# Patient Record
Sex: MALE | Race: BLACK OR AFRICAN AMERICAN | NOT HISPANIC OR LATINO | Employment: FULL TIME | URBAN - METROPOLITAN AREA
[De-identification: names, ages, dates, MRNs, and addresses within clinical notes are randomized per-mention and may not be internally consistent; named-entity substitution may affect disease eponyms.]

---

## 2023-06-15 PROBLEM — H61.23 BILATERAL IMPACTED CERUMEN: Status: ACTIVE | Noted: 2023-06-15

## 2023-06-15 PROBLEM — H57.89 REDNESS OF EYE, RIGHT: Status: ACTIVE | Noted: 2023-06-15

## 2023-06-15 PROBLEM — S05.51XA: Status: ACTIVE | Noted: 2023-06-15

## 2023-06-15 PROBLEM — Z96.1 PSEUDOPHAKIA OF BOTH EYES: Status: ACTIVE | Noted: 2023-06-15

## 2023-06-15 PROBLEM — R01.1 SYSTOLIC MURMUR: Status: ACTIVE | Noted: 2023-06-15

## 2023-06-15 PROBLEM — H18.529 ABMD (ANTERIOR BASEMENT MEMBRANE DYSTROPHY): Status: ACTIVE | Noted: 2023-06-15

## 2023-06-15 PROBLEM — H26.9 CATARACT, BILATERAL: Status: ACTIVE | Noted: 2023-06-15

## 2023-06-15 PROBLEM — I10 HTN (HYPERTENSION): Status: ACTIVE | Noted: 2023-06-15

## 2023-06-15 PROBLEM — H90.11 CONDUCTIVE HEARING LOSS OF RIGHT EAR WITH UNRESTRICTED HEARING OF LEFT EAR: Status: ACTIVE | Noted: 2023-06-15

## 2023-06-15 PROBLEM — G47.33 OBSTRUCTIVE SLEEP APNEA, ADULT: Status: ACTIVE | Noted: 2023-06-15

## 2023-06-15 PROBLEM — E55.9 VITAMIN D INSUFFICIENCY: Status: ACTIVE | Noted: 2023-06-15

## 2023-06-15 PROBLEM — H53.8 BLURRING OF VISUAL IMAGE: Status: ACTIVE | Noted: 2023-06-15

## 2023-06-15 PROBLEM — R73.01 IFG (IMPAIRED FASTING GLUCOSE): Status: ACTIVE | Noted: 2023-06-15

## 2023-06-15 RX ORDER — HYDROCHLOROTHIAZIDE 25 MG/1
1 TABLET ORAL DAILY
COMMUNITY
Start: 2018-07-09 | End: 2023-09-29

## 2023-06-15 RX ORDER — LATANOPROST 50 UG/ML
1 SOLUTION/ DROPS OPHTHALMIC NIGHTLY
COMMUNITY
Start: 2017-11-21

## 2023-06-15 RX ORDER — VIT C/E/ZN/COPPR/LUTEIN/ZEAXAN 250MG-90MG
1 CAPSULE ORAL DAILY
COMMUNITY
Start: 2022-08-24 | End: 2024-06-03 | Stop reason: ALTCHOICE

## 2023-06-15 RX ORDER — CETIRIZINE HYDROCHLORIDE 10 MG/1
10 TABLET ORAL DAILY
COMMUNITY

## 2023-06-19 ENCOUNTER — APPOINTMENT (OUTPATIENT)
Dept: LAB | Facility: LAB | Age: 63
End: 2023-06-19
Payer: COMMERCIAL

## 2023-06-19 ENCOUNTER — OFFICE VISIT (OUTPATIENT)
Dept: PRIMARY CARE | Facility: CLINIC | Age: 63
End: 2023-06-19
Payer: COMMERCIAL

## 2023-06-19 VITALS
TEMPERATURE: 96.3 F | BODY MASS INDEX: 33.49 KG/M2 | OXYGEN SATURATION: 99 % | HEIGHT: 63 IN | DIASTOLIC BLOOD PRESSURE: 81 MMHG | SYSTOLIC BLOOD PRESSURE: 150 MMHG | WEIGHT: 189 LBS

## 2023-06-19 DIAGNOSIS — Z12.5 PROSTATE CANCER SCREENING: ICD-10-CM

## 2023-06-19 DIAGNOSIS — M54.50 ACUTE MIDLINE LOW BACK PAIN WITHOUT SCIATICA: ICD-10-CM

## 2023-06-19 DIAGNOSIS — I10 PRIMARY HYPERTENSION: ICD-10-CM

## 2023-06-19 DIAGNOSIS — R35.0 INCREASED URINARY FREQUENCY: ICD-10-CM

## 2023-06-19 DIAGNOSIS — R73.01 IFG (IMPAIRED FASTING GLUCOSE): Primary | ICD-10-CM

## 2023-06-19 DIAGNOSIS — E55.9 VITAMIN D INSUFFICIENCY: ICD-10-CM

## 2023-06-19 DIAGNOSIS — Z12.11 COLON CANCER SCREENING: ICD-10-CM

## 2023-06-19 DIAGNOSIS — R52 PAIN: ICD-10-CM

## 2023-06-19 PROCEDURE — 3079F DIAST BP 80-89 MM HG: CPT | Performed by: INTERNAL MEDICINE

## 2023-06-19 PROCEDURE — 99214 OFFICE O/P EST MOD 30 MIN: CPT | Performed by: INTERNAL MEDICINE

## 2023-06-19 PROCEDURE — 3077F SYST BP >= 140 MM HG: CPT | Performed by: INTERNAL MEDICINE

## 2023-06-19 SDOH — ECONOMIC STABILITY: FOOD INSECURITY: WITHIN THE PAST 12 MONTHS, THE FOOD YOU BOUGHT JUST DIDN'T LAST AND YOU DIDN'T HAVE MONEY TO GET MORE.: NEVER TRUE

## 2023-06-19 SDOH — ECONOMIC STABILITY: FOOD INSECURITY: WITHIN THE PAST 12 MONTHS, YOU WORRIED THAT YOUR FOOD WOULD RUN OUT BEFORE YOU GOT MONEY TO BUY MORE.: NEVER TRUE

## 2023-06-19 ASSESSMENT — PATIENT HEALTH QUESTIONNAIRE - PHQ9
SUM OF ALL RESPONSES TO PHQ9 QUESTIONS 1 & 2: 0
1. LITTLE INTEREST OR PLEASURE IN DOING THINGS: NOT AT ALL
2. FEELING DOWN, DEPRESSED OR HOPELESS: NOT AT ALL

## 2023-06-19 ASSESSMENT — LIFESTYLE VARIABLES
HOW OFTEN DO YOU HAVE A DRINK CONTAINING ALCOHOL: NEVER
HOW OFTEN DO YOU HAVE SIX OR MORE DRINKS ON ONE OCCASION: NEVER
HOW MANY STANDARD DRINKS CONTAINING ALCOHOL DO YOU HAVE ON A TYPICAL DAY: PATIENT DOES NOT DRINK
AUDIT-C TOTAL SCORE: 0
SKIP TO QUESTIONS 9-10: 1

## 2023-06-19 ASSESSMENT — PAIN SCALES - GENERAL: PAINLEVEL: 10-WORST PAIN EVER

## 2023-06-19 NOTE — PROGRESS NOTES
"Chief Complaint/HPI:    HTN: patient takes HCTZ only at the present time      IFG: patient had blood testing in 5/2021, brother and father have had issues with glucose also     s/p cataract surgery: patient had cataract surgery at [facility] in [place], patient states that he still has visual issues, patient has seen Dr Valencia at [facility] for evaluation . He wanted to defer any additional eye procedures for now, a \"scraping \" was suggested to improve the vision, but the patient has deferred for now. He now sees Dr Almodovar at [facility]     Patient did not have labs completed, he is due for lab testing     vitamin d insufficiency: patient takes vitamin d    Low back pain: patient has noted low back pain for the past few days, the pain is lower back region, it does not radiate to the lower extremities.     Urinary frequency: patient is having urinary frequency and he get urgency also  ROS otherwise negative aside from what was mentioned above in HPI.      Patient Active Problem List   Diagnosis    ABMD (anterior basement membrane dystrophy)    Bilateral impacted cerumen    Blurring of visual image    Cataract, bilateral    Conductive hearing loss of right ear with unrestricted hearing of left ear    Foreign body in anterior segment of right eyeball    HTN (hypertension)    IFG (impaired fasting glucose)    Obstructive sleep apnea, adult    Pseudophakia of both eyes    Redness of eye, right    Vitamin D insufficiency    Systolic murmur    Acute midline low back pain without sciatica    Prostate cancer screening    Increased urinary frequency         Past Medical History:   Diagnosis Date    Other allergy status, other than to drugs and biological substances     History of environmental allergies    Personal history of other diseases of the circulatory system 11/05/2019    History of hypertension    Personal history of other diseases of the nervous system and sense organs     History of glaucoma     Past Surgical History:   Procedure " "Laterality Date    OTHER SURGICAL HISTORY  05/18/2021    Cataract surgery     Social History     Social History Narrative    Not on file         ALLERGIES  Patient has no known allergies.      MEDICATIONS  Current Outpatient Medications on File Prior to Visit   Medication Sig Dispense Refill    aspirin-calcium carbonate 81 mg-300 mg calcium(777 mg) tablet Take by mouth.      cetirizine (ZyrTEC) 10 mg tablet Take 1 tablet (10 mg) by mouth once daily.      cholecalciferol (Vitamin D-3) 25 MCG (1000 UT) capsule Take 1 capsule (25 mcg) by mouth once daily.      hydroCHLOROthiazide (HYDRODiuril) 25 mg tablet Take 1 tablet (25 mg) by mouth once daily.      latanoprost (Xalatan) 0.005 % ophthalmic solution Administer 1 drop into affected eye(s) once daily at bedtime.       No current facility-administered medications on file prior to visit.         PHYSICAL EXAM  /81 (BP Location: Right arm, Patient Position: Sitting, BP Cuff Size: Adult)   Temp 35.7 °C (96.3 °F) (Temporal)   Ht 1.6 m (5' 3\")   Wt 85.7 kg (189 lb)   SpO2 99%   BMI 33.48 kg/m²   Body mass index is 33.48 kg/m².  Constitutional   General appearance: Abnormal.  well developed, appears healthy, well nourished, overweight.   Eyes   Inspection of eyes: Sclera and conjunctiva were normal. wears glasses.   Ears, Nose, Mouth, and Throat   Ears: Auricles: Normal.   Pulmonary   Respiratory assessment: No respiratory distress, normal respiratory rhythm and effort.    Auscultation of Lungs: Clear bilateral breath sounds.   Cardiovascular   Auscultation of heart: Apical pulse normal, heart rate and rhythm normal, normal S1 and S2, no murmurs and no pericardial rub. The heart rate was normal. The rhythm was regular. Heart sounds: normal S1 and normal S2. A grade 1 systolic murmur was heard at the LUSB > RUSB.    Exam for edema: No peripheral edema. no carotid bruits noted.   Lymphatic   Palpation of lymph nodes in neck: No cervical lymphadenopathy.   Neurologic "   Cranial nerves: Nerves 2-12 were intact, no focal neuro defects.   Psychiatric   Orientation: Oriented to person, place, and time.    Mood and affect: Normal.   MSK  No localized  tenderness at present, discomfort with activity noted in the region of the med lumbar spine    ASSESSMENT/PLAN  Problem List Items Addressed This Visit       HTN (hypertension)    Current Assessment & Plan     Slight systolic HTN, check labs         Relevant Orders    Comprehensive metabolic panel    CBC and Auto Differential    Lipid panel    Albumin, urine, random    Hemoglobin A1c    IFG (impaired fasting glucose) - Primary    Current Assessment & Plan     Check labs         Relevant Orders    Comprehensive metabolic panel    CBC and Auto Differential    Lipid panel    Albumin, urine, random    Hemoglobin A1c    Vitamin D insufficiency    Current Assessment & Plan     Check labs, continue vitamin d         Relevant Orders    Comprehensive metabolic panel    CBC and Auto Differential    Vitamin D 25-Hydroxy,Total    Lipid panel    Acute midline low back pain without sciatica    Current Assessment & Plan     Check an SPEP, check an LS spine x ray          Relevant Orders    Comprehensive metabolic panel    CBC and Auto Differential    Serum Protein Electrophoresis    XR lumbar spine complete 4+ views    Referral to Urology    Prostate cancer screening    Relevant Orders    Prostate Spec.Ag,Screen    Referral to Urology    Increased urinary frequency    Relevant Orders    Referral to Urology     Other Visit Diagnoses       Pain        Relevant Orders    Comprehensive metabolic panel    CBC and Auto Differential    Serum Protein Electrophoresis    XR lumbar spine complete 4+ views    PT eval and treat    Colon cancer screening        Relevant Orders    Colonoscopy        Refer to urology to assess increased urinary frequency, urinary urgency,  he also has some low back pain     Check labs, check LS spine x ray, refer to PT for back  assessment    Follow up in 6 months otherwise      Thiago Camara MD

## 2023-07-19 LAB
ALANINE AMINOTRANSFERASE (SGPT) (U/L) IN SER/PLAS: 31 U/L (ref 10–52)
ALBUMIN (G/DL) IN SER/PLAS: 4.1 G/DL (ref 3.4–5)
ALBUMIN (MG/L) IN URINE: 13.9 MG/L
ALBUMIN/CREATININE (UG/MG) IN URINE: 15.5 UG/MG CRT (ref 0–30)
ALKALINE PHOSPHATASE (U/L) IN SER/PLAS: 75 U/L (ref 33–136)
ANION GAP IN SER/PLAS: 9 MMOL/L (ref 10–20)
ASPARTATE AMINOTRANSFERASE (SGOT) (U/L) IN SER/PLAS: 21 U/L (ref 9–39)
BASOPHILS (10*3/UL) IN BLOOD BY AUTOMATED COUNT: 0.05 X10E9/L (ref 0–0.1)
BASOPHILS/100 LEUKOCYTES IN BLOOD BY AUTOMATED COUNT: 1 % (ref 0–2)
BILIRUBIN TOTAL (MG/DL) IN SER/PLAS: 0.3 MG/DL (ref 0–1.2)
CALCIDIOL (25 OH VITAMIN D3) (NG/ML) IN SER/PLAS: 10 NG/ML
CALCIUM (MG/DL) IN SER/PLAS: 8.9 MG/DL (ref 8.6–10.3)
CARBON DIOXIDE, TOTAL (MMOL/L) IN SER/PLAS: 29 MMOL/L (ref 21–32)
CHLORIDE (MMOL/L) IN SER/PLAS: 105 MMOL/L (ref 98–107)
CHOLESTEROL (MG/DL) IN SER/PLAS: 159 MG/DL (ref 0–199)
CHOLESTEROL IN HDL (MG/DL) IN SER/PLAS: 31.1 MG/DL
CHOLESTEROL/HDL RATIO: 5.1
CREATININE (MG/DL) IN SER/PLAS: 0.96 MG/DL (ref 0.5–1.3)
CREATININE (MG/DL) IN URINE: 89.9 MG/DL (ref 20–370)
EOSINOPHILS (10*3/UL) IN BLOOD BY AUTOMATED COUNT: 0.11 X10E9/L (ref 0–0.7)
EOSINOPHILS/100 LEUKOCYTES IN BLOOD BY AUTOMATED COUNT: 2.1 % (ref 0–6)
ERYTHROCYTE DISTRIBUTION WIDTH (RATIO) BY AUTOMATED COUNT: 14.7 % (ref 11.5–14.5)
ERYTHROCYTE MEAN CORPUSCULAR HEMOGLOBIN CONCENTRATION (G/DL) BY AUTOMATED: 33.2 G/DL (ref 32–36)
ERYTHROCYTE MEAN CORPUSCULAR VOLUME (FL) BY AUTOMATED COUNT: 79 FL (ref 80–100)
ERYTHROCYTES (10*6/UL) IN BLOOD BY AUTOMATED COUNT: 5.68 X10E12/L (ref 4.5–5.9)
ESTIMATED AVERAGE GLUCOSE FOR HBA1C: 140 MG/DL
GFR MALE: 90 ML/MIN/1.73M2
GLUCOSE (MG/DL) IN SER/PLAS: 121 MG/DL (ref 74–99)
HEMATOCRIT (%) IN BLOOD BY AUTOMATED COUNT: 44.9 % (ref 41–52)
HEMOGLOBIN (G/DL) IN BLOOD: 14.9 G/DL (ref 13.5–17.5)
HEMOGLOBIN A1C/HEMOGLOBIN TOTAL IN BLOOD: 6.5 %
IMMATURE GRANULOCYTES/100 LEUKOCYTES IN BLOOD BY AUTOMATED COUNT: 0.2 % (ref 0–0.9)
LDL: 110 MG/DL (ref 0–99)
LEUKOCYTES (10*3/UL) IN BLOOD BY AUTOMATED COUNT: 5.2 X10E9/L (ref 4.4–11.3)
LYMPHOCYTES (10*3/UL) IN BLOOD BY AUTOMATED COUNT: 2.05 X10E9/L (ref 1.2–4.8)
LYMPHOCYTES/100 LEUKOCYTES IN BLOOD BY AUTOMATED COUNT: 39.3 % (ref 13–44)
MONOCYTES (10*3/UL) IN BLOOD BY AUTOMATED COUNT: 0.56 X10E9/L (ref 0.1–1)
MONOCYTES/100 LEUKOCYTES IN BLOOD BY AUTOMATED COUNT: 10.7 % (ref 2–10)
NEUTROPHILS (10*3/UL) IN BLOOD BY AUTOMATED COUNT: 2.43 X10E9/L (ref 1.2–7.7)
NEUTROPHILS/100 LEUKOCYTES IN BLOOD BY AUTOMATED COUNT: 46.7 % (ref 40–80)
PLATELETS (10*3/UL) IN BLOOD AUTOMATED COUNT: 205 X10E9/L (ref 150–450)
POTASSIUM (MMOL/L) IN SER/PLAS: 2.9 MMOL/L (ref 3.5–5.3)
PROSTATE SPECIFIC AG (NG/ML) IN SER/PLAS: 1.01 NG/ML (ref 0–4)
PROTEIN TOTAL: 7.6 G/DL (ref 6.4–8.2)
PROTEIN TOTAL: 7.6 G/DL (ref 6.4–8.2)
SODIUM (MMOL/L) IN SER/PLAS: 140 MMOL/L (ref 136–145)
TRIGLYCERIDE (MG/DL) IN SER/PLAS: 90 MG/DL (ref 0–149)
UREA NITROGEN (MG/DL) IN SER/PLAS: 16 MG/DL (ref 6–23)
VLDL: 18 MG/DL (ref 0–40)

## 2023-07-20 ENCOUNTER — TELEPHONE (OUTPATIENT)
Dept: PRIMARY CARE | Facility: CLINIC | Age: 63
End: 2023-07-20
Payer: COMMERCIAL

## 2023-07-20 DIAGNOSIS — T50.2X5A DIURETIC-INDUCED HYPOKALEMIA: Primary | ICD-10-CM

## 2023-07-20 DIAGNOSIS — E87.6 DIURETIC-INDUCED HYPOKALEMIA: Primary | ICD-10-CM

## 2023-07-20 RX ORDER — POTASSIUM CHLORIDE 20 MEQ/1
20 TABLET, EXTENDED RELEASE ORAL DAILY
Qty: 30 TABLET | Refills: 1 | Status: SHIPPED | OUTPATIENT
Start: 2023-07-20 | End: 2023-09-07 | Stop reason: SDUPTHER

## 2023-07-20 NOTE — TELEPHONE ENCOUNTER
Patient had labs done per urology and potassium was 2.9. He was advised to present to ER by urologist. Please advise/

## 2023-07-23 LAB
ALBUMIN ELP: 4.2 G/DL (ref 3.4–5)
ALPHA 1: 0.3 G/DL (ref 0.2–0.6)
ALPHA 2: 0.5 G/DL (ref 0.4–1.1)
BETA: 0.9 G/DL (ref 0.5–1.2)
GAMMA GLOBULIN: 1.7 G/DL (ref 0.5–1.4)
M-PROTEIN 1: 0.3 G/DL
PATH REVIEW - SERUM IMMUNOFIXATION: NORMAL
PATH REVIEW-SERUM PROTEIN ELECTROPHORESIS: NORMAL
PROTEIN ELECTROPHORESIS INTERPRETATION: ABNORMAL
PROTEIN TOTAL: 7.6 G/DL (ref 6.4–8.2)
SERUM IMMUNOFIXATION INTERPRETATION: ABNORMAL

## 2023-08-01 ENCOUNTER — HOSPITAL ENCOUNTER (OUTPATIENT)
Dept: DATA CONVERSION | Facility: HOSPITAL | Age: 63
End: 2023-08-01
Attending: INTERNAL MEDICINE | Admitting: INTERNAL MEDICINE
Payer: COMMERCIAL

## 2023-08-01 DIAGNOSIS — D12.8 BENIGN NEOPLASM OF RECTUM: ICD-10-CM

## 2023-08-01 DIAGNOSIS — Z12.11 ENCOUNTER FOR SCREENING FOR MALIGNANT NEOPLASM OF COLON: ICD-10-CM

## 2023-08-01 DIAGNOSIS — K64.4 RESIDUAL HEMORRHOIDAL SKIN TAGS: ICD-10-CM

## 2023-08-01 DIAGNOSIS — K64.8 OTHER HEMORRHOIDS: ICD-10-CM

## 2023-08-07 LAB
COMPLETE PATHOLOGY REPORT: NORMAL
CONVERTED CLINICAL DIAGNOSIS-HISTORY: NORMAL
CONVERTED FINAL DIAGNOSIS: NORMAL
CONVERTED FINAL REPORT PDF LINK TO COPY AND PASTE: NORMAL
CONVERTED GROSS DESCRIPTION: NORMAL
CONVERTED MICROSCOPIC DESCRIPTION: NORMAL

## 2023-08-24 ENCOUNTER — LAB (OUTPATIENT)
Dept: LAB | Facility: LAB | Age: 63
End: 2023-08-24
Payer: COMMERCIAL

## 2023-08-24 DIAGNOSIS — E87.6 DIURETIC-INDUCED HYPOKALEMIA: ICD-10-CM

## 2023-08-24 DIAGNOSIS — T50.2X5A DIURETIC-INDUCED HYPOKALEMIA: ICD-10-CM

## 2023-08-24 LAB
ANION GAP IN SER/PLAS: 13 MMOL/L (ref 10–20)
CALCIUM (MG/DL) IN SER/PLAS: 9.9 MG/DL (ref 8.6–10.6)
CARBON DIOXIDE, TOTAL (MMOL/L) IN SER/PLAS: 27 MMOL/L (ref 21–32)
CHLORIDE (MMOL/L) IN SER/PLAS: 106 MMOL/L (ref 98–107)
CREATININE (MG/DL) IN SER/PLAS: 1 MG/DL (ref 0.5–1.3)
GFR MALE: 86 ML/MIN/1.73M2
GLUCOSE (MG/DL) IN SER/PLAS: 88 MG/DL (ref 74–99)
POTASSIUM (MMOL/L) IN SER/PLAS: 3.8 MMOL/L (ref 3.5–5.3)
SODIUM (MMOL/L) IN SER/PLAS: 142 MMOL/L (ref 136–145)
UREA NITROGEN (MG/DL) IN SER/PLAS: 19 MG/DL (ref 6–23)

## 2023-08-24 PROCEDURE — 80048 BASIC METABOLIC PNL TOTAL CA: CPT

## 2023-08-24 PROCEDURE — 36415 COLL VENOUS BLD VENIPUNCTURE: CPT

## 2023-09-07 ENCOUNTER — TELEPHONE (OUTPATIENT)
Dept: PRIMARY CARE | Facility: CLINIC | Age: 63
End: 2023-09-07
Payer: COMMERCIAL

## 2023-09-07 DIAGNOSIS — T50.2X5A DIURETIC-INDUCED HYPOKALEMIA: ICD-10-CM

## 2023-09-07 DIAGNOSIS — E87.6 DIURETIC-INDUCED HYPOKALEMIA: ICD-10-CM

## 2023-09-07 RX ORDER — POTASSIUM CHLORIDE 750 MG/1
10 TABLET, FILM COATED, EXTENDED RELEASE ORAL DAILY
Qty: 30 TABLET | Refills: 6 | Status: SHIPPED | OUTPATIENT
Start: 2023-09-07 | End: 2024-04-16

## 2023-09-07 NOTE — TELEPHONE ENCOUNTER
Pt got lab work last week 8/24/23 and needs to know if he needs to get a refill on his potassium chloride CR (Klor-Con M20) 20 mEq ER tablet ? Please advise.

## 2023-09-12 NOTE — TELEPHONE ENCOUNTER
Called and relayed Dr. MORAN's message to patient.  He said he already picked up the reduced dose and started taking it.  I told him if he starts feeling like his potassium is getting low again, he should call the office.  Patient expressed understanding.  He will come in December as scheduled to follow-up on how this is working for him.

## 2023-09-28 DIAGNOSIS — I10 ESSENTIAL (PRIMARY) HYPERTENSION: ICD-10-CM

## 2023-09-28 RX ORDER — TADALAFIL 5 MG/1
1 TABLET ORAL DAILY
COMMUNITY
Start: 2023-07-10 | End: 2024-06-03 | Stop reason: ALTCHOICE

## 2023-09-29 VITALS — BODY MASS INDEX: 34.25 KG/M2 | HEIGHT: 64 IN | WEIGHT: 200.62 LBS

## 2023-09-29 RX ORDER — HYDROCHLOROTHIAZIDE 25 MG/1
25 TABLET ORAL DAILY
Qty: 90 TABLET | Refills: 1 | Status: SHIPPED | OUTPATIENT
Start: 2023-09-29 | End: 2024-04-02

## 2023-12-19 ENCOUNTER — APPOINTMENT (OUTPATIENT)
Dept: PRIMARY CARE | Facility: CLINIC | Age: 63
End: 2023-12-19
Payer: COMMERCIAL

## 2024-04-02 DIAGNOSIS — I10 ESSENTIAL (PRIMARY) HYPERTENSION: ICD-10-CM

## 2024-04-02 RX ORDER — HYDROCHLOROTHIAZIDE 25 MG/1
25 TABLET ORAL DAILY
Qty: 90 TABLET | Refills: 1 | Status: SHIPPED | OUTPATIENT
Start: 2024-04-02

## 2024-04-16 DIAGNOSIS — E87.6 DIURETIC-INDUCED HYPOKALEMIA: ICD-10-CM

## 2024-04-16 DIAGNOSIS — T50.2X5A DIURETIC-INDUCED HYPOKALEMIA: ICD-10-CM

## 2024-04-16 RX ORDER — POTASSIUM CHLORIDE 750 MG/1
TABLET, EXTENDED RELEASE ORAL
Qty: 30 TABLET | Refills: 6 | Status: SHIPPED | OUTPATIENT
Start: 2024-04-16

## 2024-05-21 ENCOUNTER — APPOINTMENT (OUTPATIENT)
Dept: LAB | Facility: LAB | Age: 64
End: 2024-05-21
Payer: COMMERCIAL

## 2024-05-21 ENCOUNTER — OFFICE VISIT (OUTPATIENT)
Dept: PRIMARY CARE | Facility: CLINIC | Age: 64
End: 2024-05-21
Payer: COMMERCIAL

## 2024-05-21 VITALS
RESPIRATION RATE: 16 BRPM | WEIGHT: 200 LBS | BODY MASS INDEX: 34.15 KG/M2 | HEART RATE: 76 BPM | SYSTOLIC BLOOD PRESSURE: 144 MMHG | OXYGEN SATURATION: 95 % | DIASTOLIC BLOOD PRESSURE: 84 MMHG | TEMPERATURE: 97.5 F | HEIGHT: 64 IN

## 2024-05-21 DIAGNOSIS — R73.01 IFG (IMPAIRED FASTING GLUCOSE): ICD-10-CM

## 2024-05-21 DIAGNOSIS — D47.2 MONOCLONAL GAMMOPATHY PRESENT ON SERUM PROTEIN ELECTROPHORESIS: ICD-10-CM

## 2024-05-21 DIAGNOSIS — M65.322 TRIGGER INDEX FINGER OF LEFT HAND: ICD-10-CM

## 2024-05-21 DIAGNOSIS — E55.9 VITAMIN D INSUFFICIENCY: Primary | ICD-10-CM

## 2024-05-21 DIAGNOSIS — I10 PRIMARY HYPERTENSION: ICD-10-CM

## 2024-05-21 DIAGNOSIS — T50.2X5A DIURETIC-INDUCED HYPOKALEMIA: ICD-10-CM

## 2024-05-21 DIAGNOSIS — E87.6 DIURETIC-INDUCED HYPOKALEMIA: ICD-10-CM

## 2024-05-21 PROCEDURE — 3077F SYST BP >= 140 MM HG: CPT | Performed by: INTERNAL MEDICINE

## 2024-05-21 PROCEDURE — 1036F TOBACCO NON-USER: CPT | Performed by: INTERNAL MEDICINE

## 2024-05-21 PROCEDURE — 3079F DIAST BP 80-89 MM HG: CPT | Performed by: INTERNAL MEDICINE

## 2024-05-21 PROCEDURE — 99214 OFFICE O/P EST MOD 30 MIN: CPT | Performed by: INTERNAL MEDICINE

## 2024-05-21 RX ORDER — POTASSIUM CHLORIDE 750 MG/1
10 TABLET, FILM COATED, EXTENDED RELEASE ORAL DAILY
Qty: 90 TABLET | Refills: 1 | Status: CANCELLED | OUTPATIENT
Start: 2024-05-21

## 2024-05-21 ASSESSMENT — LIFESTYLE VARIABLES
SKIP TO QUESTIONS 9-10: 1
HOW OFTEN DO YOU HAVE A DRINK CONTAINING ALCOHOL: MONTHLY OR LESS
HOW MANY STANDARD DRINKS CONTAINING ALCOHOL DO YOU HAVE ON A TYPICAL DAY: 1 OR 2
AUDIT-C TOTAL SCORE: 1
HOW OFTEN DO YOU HAVE SIX OR MORE DRINKS ON ONE OCCASION: NEVER

## 2024-05-21 ASSESSMENT — PATIENT HEALTH QUESTIONNAIRE - PHQ9
2. FEELING DOWN, DEPRESSED OR HOPELESS: NOT AT ALL
SUM OF ALL RESPONSES TO PHQ9 QUESTIONS 1 & 2: 0
1. LITTLE INTEREST OR PLEASURE IN DOING THINGS: NOT AT ALL

## 2024-05-21 ASSESSMENT — PAIN SCALES - GENERAL: PAINLEVEL: 5

## 2024-05-21 NOTE — PROGRESS NOTES
"Chief Complaint/HPI:        Palpitations: patient gets occasional palpitations he states, he denies chest pain, but he does have stressors at work also.     HTN: patient takes HCTZ only at the present time      Suspected DM: patient had blood testing in 2023, no labs recently, younger brother has been diagnosed with DM. Patient admits to vision changes, urinary frequency and weight gain.      s/p cataract surgery: patient had cataract surgery at [facility] in [place], patient states that he still has visual issues, patient has seen Dr Valencia at [facility] for evaluation . He wanted to defer any additional eye procedures for now, a \"scraping \" was suggested to improve the vision, but the patient has deferred for now. He now sees Dr Almodovar at [facility].     Patient did did have labs completed last year    Patient does get some recurrent low back pain, he does sit a lot he states, he did note in the fall that he had low back pain when raking leaves        vitamin d insufficiency: patient takes vitamin d     Urinary frequency: patient is having urinary frequency and he get urgency also, patient saw urology, he was ordered Cialis but he never took the med    ROS otherwise negative aside from what was mentioned above in HPI.      Patient Active Problem List   Diagnosis    ABMD (anterior basement membrane dystrophy)    Bilateral impacted cerumen    Blurring of visual image    Cataract, bilateral    Conductive hearing loss of right ear with unrestricted hearing of left ear    Foreign body in anterior segment of right eyeball    HTN (hypertension)    IFG (impaired fasting glucose)    Obstructive sleep apnea, adult    Pseudophakia of both eyes    Redness of eye, right    Vitamin D insufficiency    Systolic murmur    Acute midline low back pain without sciatica    Prostate cancer screening    Increased urinary frequency    Monoclonal gammopathy present on serum protein electrophoresis    Diuretic-induced hypokalemia    Trigger index finger " "of left hand         Past Medical History:   Diagnosis Date    Other allergy status, other than to drugs and biological substances     History of environmental allergies    Personal history of other diseases of the circulatory system 11/05/2019    History of hypertension    Personal history of other diseases of the nervous system and sense organs     History of glaucoma     Past Surgical History:   Procedure Laterality Date    OTHER SURGICAL HISTORY  05/18/2021    Cataract surgery     Social History     Social History Narrative    Not on file         ALLERGIES  Patient has no known allergies.      MEDICATIONS  Current Outpatient Medications on File Prior to Visit   Medication Sig Dispense Refill    aspirin-calcium carbonate 81 mg-300 mg calcium(777 mg) tablet Take by mouth.      cetirizine (ZyrTEC) 10 mg tablet Take 1 tablet (10 mg) by mouth once daily.      hydroCHLOROthiazide (HYDRODiuril) 25 mg tablet TAKE 1 TABLET BY MOUTH EVERY DAY AS DIRECTED 90 tablet 1    latanoprost (Xalatan) 0.005 % ophthalmic solution Administer 1 drop into affected eye(s) once daily at bedtime.      potassium chloride CR 10 mEq ER tablet TAKE 1 TABLET (10 MEQ) BY MOUTH ONCE DAILY. DO NOT CRUSH OR CHEW. 30 tablet 6    cholecalciferol (Vitamin D-3) 25 MCG (1000 UT) capsule Take 1 capsule (25 mcg) by mouth once daily.      tadalafil (Cialis) 5 mg tablet Take 1 tablet (5 mg) by mouth once daily.       No current facility-administered medications on file prior to visit.         PHYSICAL EXAM  /84 (BP Location: Left arm, Patient Position: Sitting, BP Cuff Size: Adult long)   Pulse 76   Temp 36.4 °C (97.5 °F)   Resp 16   Ht 1.626 m (5' 4\")   Wt 90.7 kg (200 lb)   SpO2 95%   BMI 34.33 kg/m²   Body mass index is 34.33 kg/m².  Constitutional   General appearance:  well developed, appears healthy, well nourished, overweight.   Eyes   Inspection of eyes: Sclera and conjunctiva were normal. wears glasses.   Ears, Nose, Mouth, and Throat "   Ears: Auricles: Normal. No thyromegaly or neck masses   Pulmonary   Respiratory assessment: No respiratory distress, normal respiratory rhythm and effort.    Auscultation of Lungs: Clear bilateral breath sounds.   Cardiovascular   Auscultation of heart: Apical pulse normal, heart rate and rhythm normal, normal S1 and S2, no murmurs and no pericardial rub. The heart rate was normal. The rhythm was regular. Heart sounds: normal S1 and normal S2. A grade 1 systolic murmur was heard at the LUSB > RUSB.    Exam for edema: No peripheral edema. no carotid bruits noted.   Lymphatic   Palpation of lymph nodes in neck: No cervical lymphadenopathy.   Neurologic   Cranial nerves: Nerves 2-12 were intact, no focal neuro defects.   Psychiatric   Orientation: Oriented to person, place, and time.    Mood and affect: Normal.   MSK  No localized  tenderness at present, discomfort with activity noted in the region of the mid lumbar spine, an M spike was noted on SPEP completed last year. Pain with flexion of the left index finger       ASSESSMENT/PLAN  Problem List Items Addressed This Visit       HTN (hypertension)    Relevant Orders    Comprehensive Metabolic Panel    Lipid Panel    Albumin , Urine Random    TSH with reflex to Free T4 if abnormal    CBC and Auto Differential    IFG (impaired fasting glucose)    Current Assessment & Plan     Patient likely has DM, recheck labs now, will order labs to be completed today. Will determine need for treatment after labs are completed         Relevant Orders    Comprehensive Metabolic Panel    Hemoglobin A1C    Lipid Panel    Albumin , Urine Random    TSH with reflex to Free T4 if abnormal    CBC and Auto Differential    Referral to Orthopaedic Surgery    Vitamin D insufficiency - Primary    Relevant Orders    Comprehensive Metabolic Panel    Vitamin D 25-Hydroxy,Total (for eval of Vitamin D levels)    CBC and Auto Differential    Monoclonal gammopathy present on serum protein  electrophoresis    Current Assessment & Plan     Recheck IEP, refer to hematology for an assessment, patient has some intermittent back pain         Relevant Orders    Serum Protein Electrophoresis + Immunofixation    Referral to Benign Hematology    Comprehensive Metabolic Panel    CBC and Auto Differential    Diuretic-induced hypokalemia    Relevant Orders    Comprehensive Metabolic Panel    CBC and Auto Differential    Trigger index finger of left hand    Relevant Orders    Referral to Orthopaedic Surgery     Get labs completed now , will determine if patient requires further treatment.     Follow up in 3 months to review. Will notify the patient when labs are completed    Thiago Camara MD

## 2024-05-21 NOTE — ASSESSMENT & PLAN NOTE
Patient likely has DM, recheck labs now, will order labs to be completed today. Will determine need for treatment after labs are completed

## 2024-05-22 ENCOUNTER — LAB (OUTPATIENT)
Dept: LAB | Facility: LAB | Age: 64
End: 2024-05-22
Payer: COMMERCIAL

## 2024-05-22 DIAGNOSIS — T50.2X5A DIURETIC-INDUCED HYPOKALEMIA: ICD-10-CM

## 2024-05-22 DIAGNOSIS — M54.50 ACUTE MIDLINE LOW BACK PAIN WITHOUT SCIATICA: ICD-10-CM

## 2024-05-22 DIAGNOSIS — R73.01 IFG (IMPAIRED FASTING GLUCOSE): ICD-10-CM

## 2024-05-22 DIAGNOSIS — I10 PRIMARY HYPERTENSION: ICD-10-CM

## 2024-05-22 DIAGNOSIS — E87.6 DIURETIC-INDUCED HYPOKALEMIA: ICD-10-CM

## 2024-05-22 DIAGNOSIS — E55.9 VITAMIN D INSUFFICIENCY: ICD-10-CM

## 2024-05-22 DIAGNOSIS — D47.2 MONOCLONAL GAMMOPATHY PRESENT ON SERUM PROTEIN ELECTROPHORESIS: ICD-10-CM

## 2024-05-22 DIAGNOSIS — R52 PAIN: ICD-10-CM

## 2024-05-22 DIAGNOSIS — Z12.5 PROSTATE CANCER SCREENING: ICD-10-CM

## 2024-05-22 LAB
25(OH)D3 SERPL-MCNC: 10 NG/ML (ref 30–100)
ALBUMIN SERPL BCP-MCNC: 4.1 G/DL (ref 3.4–5)
ALP SERPL-CCNC: 73 U/L (ref 33–136)
ALT SERPL W P-5'-P-CCNC: 38 U/L (ref 10–52)
ANION GAP SERPL CALC-SCNC: 11 MMOL/L (ref 10–20)
AST SERPL W P-5'-P-CCNC: 24 U/L (ref 9–39)
BASOPHILS # BLD AUTO: 0.04 X10*3/UL (ref 0–0.1)
BASOPHILS NFR BLD AUTO: 0.7 %
BILIRUB SERPL-MCNC: 0.6 MG/DL (ref 0–1.2)
BUN SERPL-MCNC: 16 MG/DL (ref 6–23)
CALCIUM SERPL-MCNC: 9.1 MG/DL (ref 8.6–10.3)
CHLORIDE SERPL-SCNC: 103 MMOL/L (ref 98–107)
CHOLEST SERPL-MCNC: 162 MG/DL (ref 0–199)
CHOLESTEROL/HDL RATIO: 4.9
CO2 SERPL-SCNC: 28 MMOL/L (ref 21–32)
CREAT SERPL-MCNC: 0.98 MG/DL (ref 0.5–1.3)
CREAT UR-MCNC: 65.7 MG/DL (ref 20–370)
EGFRCR SERPLBLD CKD-EPI 2021: 88 ML/MIN/1.73M*2
EOSINOPHIL # BLD AUTO: 0.15 X10*3/UL (ref 0–0.7)
EOSINOPHIL NFR BLD AUTO: 2.7 %
ERYTHROCYTE [DISTWIDTH] IN BLOOD BY AUTOMATED COUNT: 14.6 % (ref 11.5–14.5)
EST. AVERAGE GLUCOSE BLD GHB EST-MCNC: 143 MG/DL
GLUCOSE SERPL-MCNC: 124 MG/DL (ref 74–99)
HBA1C MFR BLD: 6.6 %
HCT VFR BLD AUTO: 45.6 % (ref 41–52)
HDLC SERPL-MCNC: 33.4 MG/DL
HGB BLD-MCNC: 14.8 G/DL (ref 13.5–17.5)
IMM GRANULOCYTES # BLD AUTO: 0.02 X10*3/UL (ref 0–0.7)
IMM GRANULOCYTES NFR BLD AUTO: 0.4 % (ref 0–0.9)
LDLC SERPL CALC-MCNC: 106 MG/DL
LYMPHOCYTES # BLD AUTO: 1.95 X10*3/UL (ref 1.2–4.8)
LYMPHOCYTES NFR BLD AUTO: 35.5 %
MCH RBC QN AUTO: 26 PG (ref 26–34)
MCHC RBC AUTO-ENTMCNC: 32.5 G/DL (ref 32–36)
MCV RBC AUTO: 80 FL (ref 80–100)
MICROALBUMIN UR-MCNC: 7.9 MG/L
MICROALBUMIN/CREAT UR: 12 UG/MG CREAT
MONOCYTES # BLD AUTO: 0.53 X10*3/UL (ref 0.1–1)
MONOCYTES NFR BLD AUTO: 9.6 %
NEUTROPHILS # BLD AUTO: 2.81 X10*3/UL (ref 1.2–7.7)
NEUTROPHILS NFR BLD AUTO: 51.1 %
NON HDL CHOLESTEROL: 129 MG/DL (ref 0–149)
NRBC BLD-RTO: 0 /100 WBCS (ref 0–0)
PLATELET # BLD AUTO: 230 X10*3/UL (ref 150–450)
POTASSIUM SERPL-SCNC: 3 MMOL/L (ref 3.5–5.3)
PROT SERPL-MCNC: 7.4 G/DL (ref 6.4–8.2)
PROT SERPL-MCNC: 7.5 G/DL (ref 6.4–8.2)
PSA SERPL-MCNC: 1.28 NG/ML
RBC # BLD AUTO: 5.69 X10*6/UL (ref 4.5–5.9)
SODIUM SERPL-SCNC: 139 MMOL/L (ref 136–145)
TRIGL SERPL-MCNC: 113 MG/DL (ref 0–149)
TSH SERPL-ACNC: 2.56 MIU/L (ref 0.44–3.98)
VLDL: 23 MG/DL (ref 0–40)
WBC # BLD AUTO: 5.5 X10*3/UL (ref 4.4–11.3)

## 2024-05-22 PROCEDURE — 82306 VITAMIN D 25 HYDROXY: CPT

## 2024-05-22 PROCEDURE — 84155 ASSAY OF PROTEIN SERUM: CPT

## 2024-05-22 PROCEDURE — 83036 HEMOGLOBIN GLYCOSYLATED A1C: CPT

## 2024-05-22 PROCEDURE — 84165 PROTEIN E-PHORESIS SERUM: CPT

## 2024-05-22 PROCEDURE — 36415 COLL VENOUS BLD VENIPUNCTURE: CPT

## 2024-05-22 PROCEDURE — 84443 ASSAY THYROID STIM HORMONE: CPT

## 2024-05-22 PROCEDURE — 82570 ASSAY OF URINE CREATININE: CPT

## 2024-05-22 PROCEDURE — 84153 ASSAY OF PSA TOTAL: CPT

## 2024-05-22 PROCEDURE — 82043 UR ALBUMIN QUANTITATIVE: CPT

## 2024-05-22 PROCEDURE — 80061 LIPID PANEL: CPT

## 2024-05-22 PROCEDURE — 84165 PROTEIN E-PHORESIS SERUM: CPT | Performed by: INTERNAL MEDICINE

## 2024-05-22 PROCEDURE — 85025 COMPLETE CBC W/AUTO DIFF WBC: CPT

## 2024-05-22 PROCEDURE — 86320 SERUM IMMUNOELECTROPHORESIS: CPT | Performed by: INTERNAL MEDICINE

## 2024-05-22 PROCEDURE — 86334 IMMUNOFIX E-PHORESIS SERUM: CPT

## 2024-05-22 PROCEDURE — 80053 COMPREHEN METABOLIC PANEL: CPT

## 2024-05-28 ENCOUNTER — DOCUMENTATION (OUTPATIENT)
Dept: HEMATOLOGY/ONCOLOGY | Facility: HOSPITAL | Age: 64
End: 2024-05-28
Payer: COMMERCIAL

## 2024-05-28 LAB
ALBUMIN: 4.1 G/DL (ref 3.4–5)
ALPHA 1 GLOBULIN: 0.2 G/DL (ref 0.2–0.6)
ALPHA 2 GLOBULIN: 0.5 G/DL (ref 0.4–1.1)
BETA GLOBULIN: 0.9 G/DL (ref 0.5–1.2)
GAMMA GLOBULIN: 1.6 G/DL (ref 0.5–1.4)
IMMUNOFIXATION COMMENT: ABNORMAL
M-PROTEIN 1: 0.3 G/DL
PATH REVIEW - SERUM IMMUNOFIXATION: ABNORMAL
PATH REVIEW-SERUM PROTEIN ELECTROPHORESIS: ABNORMAL
PROTEIN ELECTROPHORESIS COMMENT: ABNORMAL

## 2024-05-28 NOTE — PROGRESS NOTES
5/28/24 1530  Received referral for this patient from his PCP. Patient has a hx of 0.3 m protein on SPEP, first noted in 7/2023 and on repeat recent labs. He has a normal CBC and CMP with regards to hgb, ca,creat. Patient is scheduled with Hamilton Duvall for 6/3/24 and I have sent him a text of appointment details. Patient has my contact information. SPEEDY Saucedo

## 2024-06-03 ENCOUNTER — OFFICE VISIT (OUTPATIENT)
Dept: HEMATOLOGY/ONCOLOGY | Facility: CLINIC | Age: 64
End: 2024-06-03
Payer: COMMERCIAL

## 2024-06-03 ENCOUNTER — LAB (OUTPATIENT)
Dept: LAB | Facility: CLINIC | Age: 64
End: 2024-06-03
Payer: COMMERCIAL

## 2024-06-03 VITALS
BODY MASS INDEX: 33.31 KG/M2 | OXYGEN SATURATION: 99 % | HEART RATE: 67 BPM | RESPIRATION RATE: 16 BRPM | DIASTOLIC BLOOD PRESSURE: 92 MMHG | TEMPERATURE: 97.2 F | SYSTOLIC BLOOD PRESSURE: 155 MMHG | WEIGHT: 199.96 LBS | HEIGHT: 65 IN

## 2024-06-03 DIAGNOSIS — D47.2 MONOCLONAL GAMMOPATHY PRESENT ON SERUM PROTEIN ELECTROPHORESIS: ICD-10-CM

## 2024-06-03 PROCEDURE — 84155 ASSAY OF PROTEIN SERUM: CPT

## 2024-06-03 PROCEDURE — 86334 IMMUNOFIX E-PHORESIS SERUM: CPT

## 2024-06-03 PROCEDURE — 36415 COLL VENOUS BLD VENIPUNCTURE: CPT

## 2024-06-03 PROCEDURE — 99215 OFFICE O/P EST HI 40 MIN: CPT

## 2024-06-03 PROCEDURE — 82784 ASSAY IGA/IGD/IGG/IGM EACH: CPT

## 2024-06-03 PROCEDURE — 83521 IG LIGHT CHAINS FREE EACH: CPT

## 2024-06-03 PROCEDURE — 3077F SYST BP >= 140 MM HG: CPT

## 2024-06-03 PROCEDURE — 99205 OFFICE O/P NEW HI 60 MIN: CPT

## 2024-06-03 PROCEDURE — 3080F DIAST BP >= 90 MM HG: CPT

## 2024-06-03 ASSESSMENT — ENCOUNTER SYMPTOMS
ALLERGIC/IMMUNOLOGIC NEGATIVE: 1
HEMATOLOGIC/LYMPHATIC NEGATIVE: 1
EYES NEGATIVE: 1
CARDIOVASCULAR NEGATIVE: 1
PSYCHIATRIC NEGATIVE: 1
BACK PAIN: 1
RESPIRATORY NEGATIVE: 1
FREQUENCY: 1
GASTROINTESTINAL NEGATIVE: 1
NEUROLOGICAL NEGATIVE: 1
FATIGUE: 1

## 2024-06-03 ASSESSMENT — PAIN SCALES - GENERAL: PAINLEVEL: 0-NO PAIN

## 2024-06-03 NOTE — PROGRESS NOTES
Patient ID: Jerry Naidu is a 63 y.o. male.  Referring Physician: Thiago Camara MD  [address] [place] [id number]  Primary Care Provider: Thiago Camara MD  Date of Service:  6/3/2024    Mr. Jerry Naidu is a 63 year old male. Has a past medical history of HTN, pre DM, Cataracts, Vitamin D insufficiency, ABMD, GREGOR. Presents as a referral from his Dr. Camara for a positive SPEP.     Workup:  SPEP (7/19/23): 0.3g/dL IgG Lambda M protein  SFLC (6/3/24): pending   Immunoglobulins (6/3/24): pending   24 hour urine: pending     Medical History:  HTN  Pre DM  Cataracts  Vitamin D insufficiency  ABMD  GREGOR  L trigger finger  Back pain     Surgical History  Cataract surgery    Family History  Father    · Family history of deafness or hearing loss (V19.2) (Z82.2)   · Family history of diabetes mellitus (V18.0) (Z83.3)   · Family history of Heart problem     Social History  Caffeine use (V49.89) (Z78.9)  Denies alcohol consumption (V49.89) (Z78.9)  Employed  Former smoker (V15.82) (Z87.891)    No illicit drug use    SUBJECTIVE:  History of Present Illness:  Mr. Naidu presents to clinic 6/3/24 to establish care for presumed MGUS.     Overall he is feeling well.    Notes back pain that comes and goes since he was out raking last fall. At times it can be excruciating but today it is okay. He reports not being very active. Has some fatigue which he attributes to this.     Has urinary frequency which has been going on.       Review of Systems   Constitutional:  Positive for fatigue.   HENT: Negative.     Eyes: Negative.    Respiratory: Negative.     Cardiovascular: Negative.    Gastrointestinal: Negative.    Genitourinary:  Positive for frequency.   Musculoskeletal:  Positive for back pain.   Skin: Negative.    Allergic/Immunologic: Negative.    Neurological: Negative.    Hematological: Negative.    Psychiatric/Behavioral: Negative.       OBJECTIVE:  KPS: Karnofsky Score: 90 - Able to carry  "on normal activity; minor signs or symptoms of disease    VS:  BP (!) 155/92   Pulse 67   Temp 36.2 °C (97.2 °F) (Temporal)   Resp 16   Ht (S) 1.643 m (5' 4.69\")   Wt 90.7 kg (199 lb 15.3 oz)   SpO2 99%   BMI 33.60 kg/m²   BSA: 2.03 meters squared    Physical Exam  Constitutional:       Appearance: Normal appearance. He is normal weight.   HENT:      Head: Normocephalic and atraumatic.      Nose: Nose normal.      Mouth/Throat:      Mouth: Mucous membranes are moist.      Pharynx: Oropharynx is clear.   Eyes:      Conjunctiva/sclera: Conjunctivae normal.      Pupils: Pupils are equal, round, and reactive to light.   Cardiovascular:      Rate and Rhythm: Normal rate and regular rhythm.      Pulses: Normal pulses.      Heart sounds: Normal heart sounds.   Pulmonary:      Effort: Pulmonary effort is normal.      Breath sounds: Normal breath sounds.   Abdominal:      General: Abdomen is flat. Bowel sounds are normal.      Palpations: Abdomen is soft.   Musculoskeletal:         General: Normal range of motion.      Cervical back: Normal range of motion and neck supple.   Skin:     General: Skin is warm and dry.   Neurological:      General: No focal deficit present.      Mental Status: He is alert and oriented to person, place, and time. Mental status is at baseline.   Psychiatric:         Mood and Affect: Mood normal.         Behavior: Behavior normal.         Thought Content: Thought content normal.         Judgment: Judgment normal.       Laboratory:  The pertinent laboratory results were reviewed and discussed with the patient.    Lab Results   Component Value Date    WBC 5.5 05/22/2024    HCT 45.6 05/22/2024    HGB 14.8 05/22/2024     05/22/2024    K 3.0 (L) 05/22/2024    CALCIUM 9.1 05/22/2024     05/22/2024    ALT 38 05/22/2024    AST 24 05/22/2024    BUN 16 05/22/2024    CREATININE 0.98 05/22/2024    SPEP Aberrant band detected. See immunofixation. 05/22/2024    IEPIN  05/22/2024     Known " monoclonal IgG lambda in the gamma region at 0.3 g/dL.     Unchanged from the previous analysis on 7/19/23.         Note: for a comprehensive list of the patient's lab results, access the Results Review activity.    ASSESSMENT and PLAN:    MGUS:  - 0.3g/dL IgG Lambda M protein initially detected in 7/2023  - Repeat 5/2024 unchanged 0.3g/dL   - Pending SFLC, immunoglobulins, 24 hour UPEP  - No SLiM CRAB criteria    RTC:  1 week to review labs, like 3 months from there   DAMIAN Power-CNP

## 2024-06-04 LAB
IGA SERPL-MCNC: 260 MG/DL (ref 70–400)
IGG SERPL-MCNC: 1680 MG/DL (ref 700–1600)
IGM SERPL-MCNC: 46 MG/DL (ref 40–230)
KAPPA LC SERPL-MCNC: 3.48 MG/DL (ref 0.33–1.94)
KAPPA LC/LAMBDA SER: 1.82 {RATIO} (ref 0.26–1.65)
LAMBDA LC SERPL-MCNC: 1.91 MG/DL (ref 0.57–2.63)
PROT SERPL-MCNC: 7.6 G/DL (ref 6.4–8.2)

## 2024-06-06 LAB
ALBUMIN: 4.2 G/DL (ref 3.4–5)
ALPHA 1 GLOBULIN: 0.2 G/DL (ref 0.2–0.6)
ALPHA 2 GLOBULIN: 0.6 G/DL (ref 0.4–1.1)
BETA GLOBULIN: 0.9 G/DL (ref 0.5–1.2)
GAMMA GLOBULIN: 1.6 G/DL (ref 0.5–1.4)
IMMUNOFIXATION COMMENT: ABNORMAL
M-PROTEIN 1: 0.3 G/DL
PATH REVIEW - SERUM IMMUNOFIXATION: ABNORMAL
PATH REVIEW-SERUM PROTEIN ELECTROPHORESIS: ABNORMAL
PROTEIN ELECTROPHORESIS COMMENT: ABNORMAL

## 2024-06-10 ENCOUNTER — TELEMEDICINE (OUTPATIENT)
Dept: HEMATOLOGY/ONCOLOGY | Facility: CLINIC | Age: 64
End: 2024-06-10
Payer: COMMERCIAL

## 2024-06-10 ENCOUNTER — LAB (OUTPATIENT)
Dept: LAB | Facility: LAB | Age: 64
End: 2024-06-10
Payer: COMMERCIAL

## 2024-06-10 DIAGNOSIS — D47.2 MONOCLONAL GAMMOPATHY PRESENT ON SERUM PROTEIN ELECTROPHORESIS: ICD-10-CM

## 2024-06-10 DIAGNOSIS — D47.2 MONOCLONAL GAMMOPATHY PRESENT ON SERUM PROTEIN ELECTROPHORESIS: Primary | ICD-10-CM

## 2024-06-10 LAB
COLLECT DURATION TIME SPEC: 24 HRS
PROT 24H UR-MCNC: 21 MG/DL (ref 5–25)
SPECIMEN VOL 24H UR: 800 ML
TOTAL PROTEIN (MG/24HR) IN 24 HOUR URINE UPE: 168 MG/24H

## 2024-06-10 PROCEDURE — 86335 IMMUNFIX E-PHORSIS/URINE/CSF: CPT

## 2024-06-10 PROCEDURE — 81050 URINALYSIS VOLUME MEASURE: CPT

## 2024-06-10 PROCEDURE — 99443 PR PHYS/QHP TELEPHONE EVALUATION 21-30 MIN: CPT

## 2024-06-10 PROCEDURE — 84156 ASSAY OF PROTEIN URINE: CPT

## 2024-06-10 PROCEDURE — 84166 PROTEIN E-PHORESIS/URINE/CSF: CPT

## 2024-06-10 ASSESSMENT — ENCOUNTER SYMPTOMS
FATIGUE: 1
CARDIOVASCULAR NEGATIVE: 1
GASTROINTESTINAL NEGATIVE: 1
ALLERGIC/IMMUNOLOGIC NEGATIVE: 1
EYES NEGATIVE: 1
BACK PAIN: 1
HEMATOLOGIC/LYMPHATIC NEGATIVE: 1
PSYCHIATRIC NEGATIVE: 1
FREQUENCY: 1
RESPIRATORY NEGATIVE: 1
NEUROLOGICAL NEGATIVE: 1

## 2024-06-10 NOTE — PROGRESS NOTES
Patient ID: Jerry Naidu is a 63 y.o. male.  Referring Physician: No referring provider defined for this encounter.  Primary Care Provider: Thiago Camara MD  Date of Service:  6/10/2024    Mr. Jerry Naidu is a 63 year old male. Has a past medical history of HTN, pre DM, Cataracts, Vitamin D insufficiency, ABMD, GREGOR. Presents as a referral from his Dr. Camara for a positive SPEP.     Workup:  SPEP (7/19/23): 0.3g/dL IgG Lambda M protein  SFLC (6/3/24): Kappa FLC 3.48mg/dL, Lambda FLC 1.91mg/dL. FLC ratio 1.82  Immunoglobulins (6/3/24): 1,680mg/dL  24 hour urine: pending     Medical History:  HTN  Pre DM  Cataracts  Vitamin D insufficiency  ABMD  GREGOR  L trigger finger  Back pain     Surgical History  Cataract surgery    Family History  Father    · Family history of deafness or hearing loss (V19.2) (Z82.2)   · Family history of diabetes mellitus (V18.0) (Z83.3)   · Family history of Heart problem     Social History  Caffeine use (V49.89) (Z78.9)  Denies alcohol consumption (V49.89) (Z78.9)  Employed  Former smoker (V15.82) (Z87.891)    No illicit drug use    SUBJECTIVE:  History of Present Illness:  Mr. Naidu presents to clinic 6/10/24 for a follow up phone call.    Overall doing well. He is anxious to discuss his lab results.         Review of Systems   Constitutional:  Positive for fatigue.   HENT: Negative.     Eyes: Negative.    Respiratory: Negative.     Cardiovascular: Negative.    Gastrointestinal: Negative.    Genitourinary:  Positive for frequency.   Musculoskeletal:  Positive for back pain.   Skin: Negative.    Allergic/Immunologic: Negative.    Neurological: Negative.    Hematological: Negative.    Psychiatric/Behavioral: Negative.       OBJECTIVE:  KPS: Karnofsky Score: 90 - Able to carry on normal activity; minor signs or symptoms of disease    VS:  There were no vitals taken for this visit.  BSA: There is no height or weight on file to calculate BSA.    Laboratory:  The pertinent  laboratory results were reviewed and discussed with the patient.    Lab Results   Component Value Date    WBC 5.5 05/22/2024    HCT 45.6 05/22/2024    HGB 14.8 05/22/2024     05/22/2024    K 3.0 (L) 05/22/2024    CALCIUM 9.1 05/22/2024     05/22/2024    ALT 38 05/22/2024    AST 24 05/22/2024    BUN 16 05/22/2024    CREATININE 0.98 05/22/2024    KAPPA 3.48 (H) 06/03/2024    LAMBDA 1.91 06/03/2024    KAPLS 1.82 (H) 06/03/2024    SPEP Aberrant band detected. See immunofixation. 06/03/2024    IEPIN  06/03/2024     6/3/24  Known monoclonal IgG lambda in the gamma region at 0.3 g/dL. Unchanged from the previous analysis on 5/22/24.    IGG 1,680 (H) 06/03/2024    IGM 46 06/03/2024     06/03/2024      Note: for a comprehensive list of the patient's lab results, access the Results Review activity.    ASSESSMENT and PLAN:    MGUS:  - 0.3g/dL IgG Lambda M protein initially detected in 7/2023  - Repeat 5/2024 unchanged 0.3g/dL   - Kappa FLC elevated 3.48mg/dL, Lambda FLC 1.91mg/dL, FLC ratio 1.82  - IgG 1680  - No SLiM CRAB criteria    RTC:  Repeat labs in 3 months w/ phone call    DAMIAN Power-CNP

## 2024-06-13 LAB
ALBUMIN MFR UR ELPH: 49.2 %
ALPHA1 GLOB MFR UR ELPH: 7.6 %
ALPHA2 GLOB MFR UR ELPH: 9.9 %
B-GLOBULIN MFR UR ELPH: 14.9 %
GAMMA GLOB MFR UR ELPH: 18.4 %
IMMUNOFIXATION COMMENT: NORMAL
PATH REVIEW - URINE IMMUNOFIXATION: NORMAL
PATH REVIEW-URINE PROTEIN ELECTROPHORESIS: NORMAL
URINE ELECTROPHORESIS COMMENT: NORMAL

## 2024-07-01 ENCOUNTER — APPOINTMENT (OUTPATIENT)
Dept: ORTHOPEDIC SURGERY | Facility: CLINIC | Age: 64
End: 2024-07-01
Payer: COMMERCIAL

## 2024-07-17 ENCOUNTER — APPOINTMENT (OUTPATIENT)
Dept: ORTHOPEDIC SURGERY | Facility: CLINIC | Age: 64
End: 2024-07-17
Payer: COMMERCIAL

## 2024-07-17 VITALS — BODY MASS INDEX: 33.97 KG/M2 | HEIGHT: 64 IN | WEIGHT: 199 LBS

## 2024-07-17 DIAGNOSIS — M65.322 TRIGGER INDEX FINGER OF LEFT HAND: ICD-10-CM

## 2024-07-17 DIAGNOSIS — R73.01 IFG (IMPAIRED FASTING GLUCOSE): ICD-10-CM

## 2024-07-17 PROCEDURE — 3008F BODY MASS INDEX DOCD: CPT | Performed by: ORTHOPAEDIC SURGERY

## 2024-07-17 PROCEDURE — 20550 NJX 1 TENDON SHEATH/LIGAMENT: CPT | Performed by: ORTHOPAEDIC SURGERY

## 2024-07-17 PROCEDURE — 1036F TOBACCO NON-USER: CPT | Performed by: ORTHOPAEDIC SURGERY

## 2024-07-17 PROCEDURE — 99204 OFFICE O/P NEW MOD 45 MIN: CPT | Performed by: ORTHOPAEDIC SURGERY

## 2024-07-17 RX ORDER — LIDOCAINE HYDROCHLORIDE 10 MG/ML
1 INJECTION INFILTRATION; PERINEURAL
Status: COMPLETED | OUTPATIENT
Start: 2024-07-17 | End: 2024-07-17

## 2024-07-17 NOTE — PROGRESS NOTES
63 y.o. male presents today for evaluation of left index finger catching, clicking, locking, pain.  This is a referral from Dr. Crawford.  The patient reports symptoms for years off-and-on, had a shot maybe 10 years ago that helped until recently, and now for the past years been having symptoms again.  Pain is controlled. Patient reports no numbness and tingling.  Reports no previous surgeries or trauma to the area.  Reports pain worse with use, better at rest.   Pain dull ache, sharp at times.     Review of Systems    Constitutional: see HPI, no fever, no chills, not feeling tired, no significant weight gain or weight loss.   Eyes: No vision changes  ENT: no nosebleeds.   Cardiovascular: no chest pain.   Respiratory: no shortness of breath and no cough.   Gastrointestinal: no abdominal pain, no nausea, no vomiting and no diarrhea.   Musculoskeletal: per HPI  Neurological: no headache, no gait disturbances  Psychiatric: no depression and no sleep disturbances.   Endocrine: no muscle weakness and no muscle cramps.   Hematologic/Lymphatic: no swollen glands and no tendency for easy bruising or excessive swelling.     Patient's past medical history, past surgical history, allergies, and medications have been reviewed unless otherwise noted in the chart.     Trigger Exam  Digit: Left index finger inspection:  no evidence of infection, no erythema, no edema, no ecchymosis, Palpation:  compartments are soft, TTP A1 pulley Range of Motion:  full composite finger flexion, Stability:  no finger instability, Strength:  5/5 strength with flexion and extension, Skin:  intact, Vascular:  capillary refill <2 seconds distally, Sensation:  sensation intact to light touch distally, Other:  no pain with palpation or motion proximally in the wrist.      Constitutional   General appearance: Alert and in no acute distress. Well developed, well nourished.    Eyes   External Eye, Conjunctiva and lids: Normal external exam - pupils were  equal in size, round, reactive to light (PERRL) with normal accommodation and extraocular movements intact (EOMI).   Ears, Nose, Mouth, and Throat   Hearing: Normal.   Neck   Neck: No neck mass was observed. Supple.   Pulmonary   Respiratory effort: No respiratory distress.   Cardiovascular   Examination of extremities: No peripheral edema.   Psychiatric   Judgment and insight: Intact.   Orientation to person, place, and time: Alert and oriented x 3.       Mood and affect: Normal.      Left index finger trigger  Based on the history, physical exam and imaging studies above, the patient's presentation is consistent with the above diagnosis.  I had a long discussion with the patient regarding their presentation and the treatment options.  We discussed initial nonoperative versus operative management options as well as potential further diagnostic imaging.  We again discussed her treatment options going forward along with their associated risks and benefits. After thorough discussion, the patient has elected to proceed with conservative management. All questions were answered to the patients satisfaction who seems satisfied with the plan.  They will call the office with any questions/concerns.    Discussion I discussed the diagnosis and treatment options with the patient today along with their associated risks and benefits. After thorough discussion, the patient has elected to proceed with a corticosteroid injection with Kenalog and Xylocaine, which was performed in the office today under aseptic technique and the patient tolerated the procedure well.          Ortho Injections:           Injection site left index finger A1 pulley. Medication 1 cc 1% Xylocaine, 1 cc 10 mg Kenalog, Injection given under sterile conditions. No immediate complications noted. Post injection instructions given.  Follow-up 8 weeks as needed no x-ray    Patient ID: Jerry Naidu is a 63 y.o. male.    Hand / UE Inj/Asp: L index A1 for trigger  finger on 7/17/2024 10:23 AM  Indications: therapeutic  Details: 25 G needle, volar approach  Medications: 1 mL lidocaine 10 mg/mL (1 %); 10 mg triamcinolone acetonide 10 mg/mL  Outcome: tolerated well, no immediate complications  Procedure, treatment alternatives, risks and benefits explained, specific risks discussed. Consent was given by the patient. Immediately prior to procedure a time out was called to verify the correct patient, procedure, equipment, support staff and site/side marked as required. Patient was prepped and draped in the usual sterile fashion.

## 2024-07-17 NOTE — PROGRESS NOTES
NPV LIF TRIGGER  BEEN GOING ON FOR SEVERAL MONTHS WAS TREATED FOR THIS ISSUE 10 YEARS AGO WITH INJECTIONS WOULD LIKE TO DISCUSS INJECTION AGAIN

## 2024-08-02 DIAGNOSIS — I10 ESSENTIAL (PRIMARY) HYPERTENSION: ICD-10-CM

## 2024-08-02 RX ORDER — HYDROCHLOROTHIAZIDE 25 MG/1
25 TABLET ORAL DAILY
Qty: 90 TABLET | Refills: 1 | Status: SHIPPED | OUTPATIENT
Start: 2024-08-02

## 2024-09-04 ENCOUNTER — APPOINTMENT (OUTPATIENT)
Dept: PRIMARY CARE | Facility: CLINIC | Age: 64
End: 2024-09-04
Payer: COMMERCIAL

## 2024-09-09 ENCOUNTER — TELEPHONE (OUTPATIENT)
Dept: PRIMARY CARE | Facility: CLINIC | Age: 64
End: 2024-09-09
Payer: COMMERCIAL

## 2024-09-09 DIAGNOSIS — G47.33 OBSTRUCTIVE SLEEP APNEA, ADULT: Primary | ICD-10-CM

## 2024-09-09 NOTE — TELEPHONE ENCOUNTER
Bipap is not working as well as it used to. He contacted [employer] regarding this. The company suggested he contact his DrLiz To be sure it is covered by his insurance. He needs supplies as well.

## 2024-09-11 NOTE — TELEPHONE ENCOUNTER
"Most recent Sleep study recommends \"BiPAP with an inspiratpry pressure of 14 and an expiratory pressure or 9 via a ResMed Activa Mask with a heated humidifier and a 20-minute ramp for patient comfort\".  "

## 2024-09-16 NOTE — TELEPHONE ENCOUNTER
Patient called back stating he needs an order for new supplies, head strap and tubing.  Patient doesn't want a new machine but would rather have new supplies to see if his machine will work better.    [phone]  [employer]

## 2024-10-10 ENCOUNTER — APPOINTMENT (OUTPATIENT)
Dept: PRIMARY CARE | Facility: CLINIC | Age: 64
End: 2024-10-10
Payer: COMMERCIAL

## 2024-10-10 VITALS
OXYGEN SATURATION: 99 % | TEMPERATURE: 97.2 F | BODY MASS INDEX: 33.72 KG/M2 | HEIGHT: 64 IN | RESPIRATION RATE: 16 BRPM | SYSTOLIC BLOOD PRESSURE: 150 MMHG | WEIGHT: 197.5 LBS | DIASTOLIC BLOOD PRESSURE: 86 MMHG | HEART RATE: 60 BPM

## 2024-10-10 DIAGNOSIS — G47.33 OBSTRUCTIVE SLEEP APNEA, ADULT: ICD-10-CM

## 2024-10-10 DIAGNOSIS — I10 PRIMARY HYPERTENSION: Primary | ICD-10-CM

## 2024-10-10 DIAGNOSIS — M65.322 TRIGGER INDEX FINGER OF LEFT HAND: ICD-10-CM

## 2024-10-10 DIAGNOSIS — R35.0 INCREASED URINARY FREQUENCY: ICD-10-CM

## 2024-10-10 DIAGNOSIS — D47.2 MONOCLONAL GAMMOPATHY PRESENT ON SERUM PROTEIN ELECTROPHORESIS: ICD-10-CM

## 2024-10-10 DIAGNOSIS — E55.9 VITAMIN D INSUFFICIENCY: ICD-10-CM

## 2024-10-10 DIAGNOSIS — E11.9 TYPE 2 DIABETES MELLITUS WITHOUT COMPLICATION, WITHOUT LONG-TERM CURRENT USE OF INSULIN (MULTI): ICD-10-CM

## 2024-10-10 PROBLEM — Z86.69 HISTORY OF GLAUCOMA: Status: ACTIVE | Noted: 2024-10-10

## 2024-10-10 PROBLEM — H61.20 IMPACTED CERUMEN: Status: RESOLVED | Noted: 2024-10-10 | Resolved: 2024-10-10

## 2024-10-10 PROBLEM — H61.23 BILATERAL IMPACTED CERUMEN: Status: RESOLVED | Noted: 2023-06-15 | Resolved: 2024-10-10

## 2024-10-10 PROBLEM — Z86.79 HISTORY OF HYPERTENSION: Status: ACTIVE | Noted: 2024-10-10

## 2024-10-10 PROCEDURE — 1036F TOBACCO NON-USER: CPT | Performed by: INTERNAL MEDICINE

## 2024-10-10 PROCEDURE — 99215 OFFICE O/P EST HI 40 MIN: CPT | Performed by: INTERNAL MEDICINE

## 2024-10-10 PROCEDURE — 3061F NEG MICROALBUMINURIA REV: CPT | Performed by: INTERNAL MEDICINE

## 2024-10-10 PROCEDURE — 3044F HG A1C LEVEL LT 7.0%: CPT | Performed by: INTERNAL MEDICINE

## 2024-10-10 PROCEDURE — 3049F LDL-C 100-129 MG/DL: CPT | Performed by: INTERNAL MEDICINE

## 2024-10-10 PROCEDURE — 3079F DIAST BP 80-89 MM HG: CPT | Performed by: INTERNAL MEDICINE

## 2024-10-10 PROCEDURE — 3008F BODY MASS INDEX DOCD: CPT | Performed by: INTERNAL MEDICINE

## 2024-10-10 PROCEDURE — 3077F SYST BP >= 140 MM HG: CPT | Performed by: INTERNAL MEDICINE

## 2024-10-10 RX ORDER — ACETAMINOPHEN 500 MG
2000 TABLET ORAL DAILY
Qty: 100 CAPSULE | Refills: 2
Start: 2024-10-10

## 2024-10-10 RX ORDER — TIMOLOL MALEATE 5 MG/ML
1 SOLUTION OPHTHALMIC DAILY
COMMUNITY
Start: 2024-09-15

## 2024-10-10 RX ORDER — METFORMIN HYDROCHLORIDE 500 MG/1
500 TABLET, EXTENDED RELEASE ORAL
Qty: 90 TABLET | Refills: 2 | Status: SHIPPED | OUTPATIENT
Start: 2024-10-10 | End: 2025-11-14

## 2024-10-10 RX ORDER — AMLODIPINE BESYLATE 5 MG/1
5 TABLET ORAL DAILY
Qty: 90 TABLET | Refills: 2 | Status: SHIPPED | OUTPATIENT
Start: 2024-10-10 | End: 2025-10-10

## 2024-10-10 RX ORDER — VITAMIN E MIXED 400 UNIT
400 CAPSULE ORAL DAILY
Qty: 100 CAPSULE | Refills: 1 | Status: SHIPPED | OUTPATIENT
Start: 2024-10-10 | End: 2025-10-10

## 2024-10-10 SDOH — ECONOMIC STABILITY: FOOD INSECURITY: WITHIN THE PAST 12 MONTHS, THE FOOD YOU BOUGHT JUST DIDN'T LAST AND YOU DIDN'T HAVE MONEY TO GET MORE.: NEVER TRUE

## 2024-10-10 SDOH — ECONOMIC STABILITY: FOOD INSECURITY: WITHIN THE PAST 12 MONTHS, YOU WORRIED THAT YOUR FOOD WOULD RUN OUT BEFORE YOU GOT MONEY TO BUY MORE.: NEVER TRUE

## 2024-10-10 ASSESSMENT — LIFESTYLE VARIABLES
AUDIT-C TOTAL SCORE: 1
SKIP TO QUESTIONS 9-10: 1
HOW OFTEN DO YOU HAVE SIX OR MORE DRINKS ON ONE OCCASION: NEVER
HOW OFTEN DO YOU HAVE A DRINK CONTAINING ALCOHOL: MONTHLY OR LESS
HOW MANY STANDARD DRINKS CONTAINING ALCOHOL DO YOU HAVE ON A TYPICAL DAY: 1 OR 2

## 2024-10-10 ASSESSMENT — PATIENT HEALTH QUESTIONNAIRE - PHQ9
1. LITTLE INTEREST OR PLEASURE IN DOING THINGS: NOT AT ALL
SUM OF ALL RESPONSES TO PHQ9 QUESTIONS 1 & 2: 0
2. FEELING DOWN, DEPRESSED OR HOPELESS: NOT AT ALL

## 2024-10-10 NOTE — ASSESSMENT & PLAN NOTE
Patient may try vitamin E 400 international units daily, see if this helps reduce the tendonitis of the hand, improved control of glucose may help also

## 2024-10-10 NOTE — ASSESSMENT & PLAN NOTE
Will try metformin for glucose , will change hydrochlorothiazide to amlodipine to see if urinary frequency improves. Recheck vitals in 4 weeks.

## 2024-10-10 NOTE — ASSESSMENT & PLAN NOTE
patient has urinary frequency and hypokalemia. Stop the hydrochlorothiazide , amlodipine will tried, patient developed angioedema from use of lisinopril in the past

## 2024-10-10 NOTE — PROGRESS NOTES
"Chief Complaint/HPI:    Follow up:    HTN: patient takes HCTZ 25 mg . BP today 150/86. He does not monitor his BP at home. Denies chest pain, SOB, pounding headache.      DM type 2: Hgb A1C 6.6.  Patient admits to vision changes, urinary frequency.      S/p cataract surgery: patient had cataract surgery at [facility] in [place], patient states that he still has visual issues, patient has seen Dr Valencia at [facility] for evaluation . He wanted to defer any additional eye procedures for now, a \"scraping \" was suggested to improve the vision, but the patient has deferred for now. He now sees Dr Almodovar at [facility]. He uses ophthalmic drops.     MGUS: Follows with heme/onc, repeat labs every 3 months (order from June has not been completed). He does not have an appointment to follow up , he is concerned about medical costs at this time     Vitamin D insufficiency: patient has not been taking vitamin. Most recent level was 10.     Left index finger trigger: Saw Dr. Gil and received injection. States it helped a little but he still cannot close the hand all the way.     Urinary frequency: patient is having urinary frequency and he gets urgency also, patient saw urology, he was ordered Cialis but he never took the med , he has been getting a lot of bills for testing that was completed. He appears to have been seen by urology over a year ago.  He denies nocturia, he urinate frequently during the day.    Colonoscopy 2023 with 7 year clearance.    ROS otherwise negative aside from what was mentioned above in HPI.      Patient Active Problem List   Diagnosis    ABMD (anterior basement membrane dystrophy)    Blurring of visual image    Cataract, bilateral    Conductive hearing loss of right ear with unrestricted hearing of left ear    Foreign body in anterior segment of right eyeball    HTN (hypertension)    IFG (impaired fasting glucose)    Obstructive sleep apnea, adult    Pseudophakia of both eyes    Redness of eye, right    Vitamin D " insufficiency    Systolic murmur    Acute midline low back pain without sciatica    Prostate cancer screening    Increased urinary frequency    Monoclonal gammopathy present on serum protein electrophoresis    Diuretic-induced hypokalemia    Trigger index finger of left hand    Type 2 diabetes mellitus, without long-term current use of insulin (Multi)    History of glaucoma    History of hypertension         Past Medical History:   Diagnosis Date    Impacted lorin 10/10/2024    Other allergy status, other than to drugs and biological substances     History of environmental allergies    Personal history of other diseases of the circulatory system 11/05/2019    History of hypertension    Personal history of other diseases of the nervous system and sense organs     History of glaucoma     Past Surgical History:   Procedure Laterality Date    OTHER SURGICAL HISTORY  05/18/2021    Cataract surgery     Social History     Social History Narrative    Not on file         ALLERGIES  Patient has no known allergies.      MEDICATIONS  Current Outpatient Medications on File Prior to Visit   Medication Sig Dispense Refill    aspirin-calcium carbonate 81 mg-300 mg calcium(777 mg) tablet Take by mouth.      cetirizine (ZyrTEC) 10 mg tablet Take 1 tablet (10 mg) by mouth once daily.      hydroCHLOROthiazide (HYDRODiuril) 25 mg tablet TAKE 1 TABLET BY MOUTH EVERY DAY AS DIRECTED 90 tablet 1    latanoprost (Xalatan) 0.005 % ophthalmic solution Administer 1 drop into affected eye(s) once daily at bedtime.      potassium chloride CR 10 mEq ER tablet TAKE 1 TABLET (10 MEQ) BY MOUTH ONCE DAILY. DO NOT CRUSH OR CHEW. 30 tablet 6    timolol (Timoptic-XR) 0.5 % ophthalmic gel-forming Administer 1 drop into the right eye once daily.       No current facility-administered medications on file prior to visit.         PHYSICAL EXAM  /86 (BP Location: Right arm, Patient Position: Sitting, BP Cuff Size: Large adult)   Pulse 60   Temp 36.2  "°C (97.2 °F)   Resp 16   Ht 1.626 m (5' 4\")   Wt 89.6 kg (197 lb 8 oz)   SpO2 99%   BMI 33.90 kg/m²   Body mass index is 33.9 kg/m².  Constitutional   General appearance:  well developed, appears healthy, well nourished, overweight.   Eyes   Inspection of eyes: Sclera and conjunctiva were normal. wears glasses.   Ears, Nose, Mouth, and Throat   Ears: Auricles: Normal. No thyromegaly or neck masses   Pulmonary   Respiratory assessment: No respiratory distress, normal respiratory rhythm and effort.    Auscultation of Lungs: Clear bilateral breath sounds.   Cardiovascular   Auscultation of heart: Apical pulse normal,  The rhythm was regular. Heart sounds: normal S1 and normal S2. A soft systolic murmur was heard at the LUSB > RUSB.    Exam for edema: No peripheral edema. no carotid bruits noted.   Lymphatic   Palpation of lymph nodes in neck: No cervical lymphadenopathy.   Neurologic   Cranial nerves: Nerves 2-12 were intact, no focal neuro defects.   Psychiatric   Orientation: Oriented to person, place, and time.    Mood and affect: Normal.   MSK  No localized  tenderness at present. Flexion of the left index finger has improved. He has seen urology and oncology.    ASSESSMENT/PLAN  Problem List Items Addressed This Visit       HTN (hypertension) - Primary    Current Assessment & Plan     patient has urinary frequency and hypokalemia. Stop the hydrochlorothiazide , amlodipine will tried, patient developed angioedema from use of lisinopril in the past         Relevant Medications    amLODIPine (Norvasc) 5 mg tablet    Other Relevant Orders    Comprehensive Metabolic Panel    Lipid Panel    Increased urinary frequency    Current Assessment & Plan     Will try metformin for glucose , will change hydrochlorothiazide to amlodipine to see if urinary frequency improves. Recheck vitals in 4 weeks.          Relevant Orders    Comprehensive Metabolic Panel    Monoclonal gammopathy present on serum protein electrophoresis "    Current Assessment & Plan     Continue to follow with heme/onc and their recommendations         Relevant Orders    Comprehensive Metabolic Panel    Obstructive sleep apnea, adult    Current Assessment & Plan     Continue to use CPAP         Relevant Orders    Comprehensive Metabolic Panel    Trigger index finger of left hand    Current Assessment & Plan     Patient may try vitamin E 400 international units daily, see if this helps reduce the tendonitis of the hand, improved control of glucose may help also           Relevant Medications    vitamin E 180 mg (400 unit) capsule    Other Relevant Orders    Comprehensive Metabolic Panel    Type 2 diabetes mellitus, without long-term current use of insulin (Multi)    Current Assessment & Plan     Patient has urinary frequency, trial of low dose Metformin XL daily, recheck labs as ordered         Relevant Medications    metFORMIN XR (Glucophage-XR) 500 mg 24 hr tablet    Other Relevant Orders    Albumin-Creatinine Ratio, Urine Random    Comprehensive Metabolic Panel    Hemoglobin A1C    Lipid Panel    Vitamin D insufficiency    Current Assessment & Plan     Continue supplementation, recommend 2000 units daily         Relevant Medications    cholecalciferol (Vitamin D3) 50 mcg (2,000 unit) capsule    Other Relevant Orders    Comprehensive Metabolic Panel    Vitamin D 25-Hydroxy,Total (for eval of Vitamin D levels)     Complete blood work as ordered. Follow up in 6 months. Patient does not want flu vaccine today. He will check BP at home.     Follow up in 6 months.    Thiago Camara MD

## 2024-11-05 ENCOUNTER — TELEPHONE (OUTPATIENT)
Dept: PRIMARY CARE | Facility: CLINIC | Age: 64
End: 2024-11-05
Payer: COMMERCIAL

## 2024-11-05 NOTE — TELEPHONE ENCOUNTER
Patient phoned because he is having lip swelling in conjunction with 2 new meds.  He stopped taking them and waited for a couple weeks and tried them again and the lip swelling has started again.  He wants to stop both. But he also wants to take a previous blood pressure med in place.  The medications are Metformin and Amlopodine.  Please advise

## 2024-11-06 ENCOUNTER — APPOINTMENT (OUTPATIENT)
Dept: PRIMARY CARE | Facility: CLINIC | Age: 64
End: 2024-11-06
Payer: COMMERCIAL

## 2024-11-20 ENCOUNTER — APPOINTMENT (OUTPATIENT)
Dept: PRIMARY CARE | Facility: CLINIC | Age: 64
End: 2024-11-20
Payer: COMMERCIAL

## 2024-11-20 VITALS
SYSTOLIC BLOOD PRESSURE: 136 MMHG | OXYGEN SATURATION: 94 % | HEIGHT: 64 IN | TEMPERATURE: 97.1 F | HEART RATE: 66 BPM | DIASTOLIC BLOOD PRESSURE: 82 MMHG | BODY MASS INDEX: 34.15 KG/M2 | WEIGHT: 200 LBS | RESPIRATION RATE: 16 BRPM

## 2024-11-20 DIAGNOSIS — E87.6 DIURETIC-INDUCED HYPOKALEMIA: ICD-10-CM

## 2024-11-20 DIAGNOSIS — T50.905A ANGIOEDEMA WITH URTICARIA DUE TO DRUG: ICD-10-CM

## 2024-11-20 DIAGNOSIS — I10 PRIMARY HYPERTENSION: ICD-10-CM

## 2024-11-20 DIAGNOSIS — E11.9 TYPE 2 DIABETES MELLITUS WITHOUT COMPLICATION, WITHOUT LONG-TERM CURRENT USE OF INSULIN (MULTI): Primary | ICD-10-CM

## 2024-11-20 DIAGNOSIS — T78.3XXA ANGIOEDEMA WITH URTICARIA DUE TO DRUG: ICD-10-CM

## 2024-11-20 DIAGNOSIS — T50.2X5A DIURETIC-INDUCED HYPOKALEMIA: ICD-10-CM

## 2024-11-20 PROCEDURE — 99214 OFFICE O/P EST MOD 30 MIN: CPT | Performed by: INTERNAL MEDICINE

## 2024-11-20 PROCEDURE — 1036F TOBACCO NON-USER: CPT | Performed by: INTERNAL MEDICINE

## 2024-11-20 PROCEDURE — 3061F NEG MICROALBUMINURIA REV: CPT | Performed by: INTERNAL MEDICINE

## 2024-11-20 PROCEDURE — 3075F SYST BP GE 130 - 139MM HG: CPT | Performed by: INTERNAL MEDICINE

## 2024-11-20 PROCEDURE — 3008F BODY MASS INDEX DOCD: CPT | Performed by: INTERNAL MEDICINE

## 2024-11-20 PROCEDURE — 3079F DIAST BP 80-89 MM HG: CPT | Performed by: INTERNAL MEDICINE

## 2024-11-20 PROCEDURE — 3044F HG A1C LEVEL LT 7.0%: CPT | Performed by: INTERNAL MEDICINE

## 2024-11-20 PROCEDURE — 3049F LDL-C 100-129 MG/DL: CPT | Performed by: INTERNAL MEDICINE

## 2024-11-20 RX ORDER — POTASSIUM CHLORIDE 750 MG/1
10 TABLET, FILM COATED, EXTENDED RELEASE ORAL DAILY
Qty: 90 TABLET | Refills: 3 | Status: SHIPPED | OUTPATIENT
Start: 2024-11-20 | End: 2025-11-20

## 2024-11-20 SDOH — ECONOMIC STABILITY: FOOD INSECURITY: WITHIN THE PAST 12 MONTHS, THE FOOD YOU BOUGHT JUST DIDN'T LAST AND YOU DIDN'T HAVE MONEY TO GET MORE.: NEVER TRUE

## 2024-11-20 SDOH — ECONOMIC STABILITY: FOOD INSECURITY: WITHIN THE PAST 12 MONTHS, YOU WORRIED THAT YOUR FOOD WOULD RUN OUT BEFORE YOU GOT MONEY TO BUY MORE.: NEVER TRUE

## 2024-11-20 ASSESSMENT — PATIENT HEALTH QUESTIONNAIRE - PHQ9
1. LITTLE INTEREST OR PLEASURE IN DOING THINGS: NOT AT ALL
2. FEELING DOWN, DEPRESSED OR HOPELESS: NOT AT ALL
SUM OF ALL RESPONSES TO PHQ9 QUESTIONS 1 & 2: 0

## 2024-11-20 ASSESSMENT — LIFESTYLE VARIABLES
HOW OFTEN DO YOU HAVE A DRINK CONTAINING ALCOHOL: NEVER
SKIP TO QUESTIONS 9-10: 1
AUDIT-C TOTAL SCORE: 0
HOW MANY STANDARD DRINKS CONTAINING ALCOHOL DO YOU HAVE ON A TYPICAL DAY: PATIENT DOES NOT DRINK
HOW OFTEN DO YOU HAVE SIX OR MORE DRINKS ON ONE OCCASION: NEVER

## 2024-11-20 NOTE — ASSESSMENT & PLAN NOTE
Not certain if metformin contributed to angioedema, will try Jardiance 10 mg daily, this may also have benefit of lowering BP . Will see if this is tolerated.  Check labs as already ordered

## 2024-11-20 NOTE — PROGRESS NOTES
Chief Complaint/HPI:  Acute visit:  Patient developed some lip swelling with use of amlodipine and metformin. Patient developed angioedema with use  of lisinopril in the past, he notes. The swelling subsided about 1-2 days after trial of new meds.The patient took the meds for 1-2 days prior to developing swelling. He takes hydrochlorothiazide again. He takes no meds for DM now    ROS otherwise negative aside from what was mentioned above in HPI.      Patient Active Problem List   Diagnosis    ABMD (anterior basement membrane dystrophy)    Blurring of visual image    Cataract, bilateral    Conductive hearing loss of right ear with unrestricted hearing of left ear    Foreign body in anterior segment of right eyeball    HTN (hypertension)    IFG (impaired fasting glucose)    Obstructive sleep apnea, adult    Pseudophakia of both eyes    Redness of eye, right    Vitamin D insufficiency    Systolic murmur    Acute midline low back pain without sciatica    Prostate cancer screening    Increased urinary frequency    Monoclonal gammopathy present on serum protein electrophoresis    Diuretic-induced hypokalemia    Trigger index finger of left hand    Type 2 diabetes mellitus, without long-term current use of insulin (Multi)    History of glaucoma    History of hypertension         Past Medical History:   Diagnosis Date    Impacted cerumen 10/10/2024    Other allergy status, other than to drugs and biological substances     History of environmental allergies    Personal history of other diseases of the circulatory system 11/05/2019    History of hypertension    Personal history of other diseases of the nervous system and sense organs     History of glaucoma     Past Surgical History:   Procedure Laterality Date    OTHER SURGICAL HISTORY  05/18/2021    Cataract surgery     Social History     Social History Narrative    Not on file         ALLERGIES  Lisinopril      MEDICATIONS  Current Outpatient Medications on File Prior to  "Visit   Medication Sig Dispense Refill    aspirin-calcium carbonate 81 mg-300 mg calcium(777 mg) tablet Take by mouth.      cetirizine (ZyrTEC) 10 mg tablet Take 1 tablet (10 mg) by mouth once daily.      cholecalciferol (Vitamin D3) 50 mcg (2,000 unit) capsule Take 1 capsule (50 mcg) by mouth early in the morning.. 100 capsule 2    timolol (Timoptic-XR) 0.5 % ophthalmic gel-forming Administer 1 drop into the right eye once daily.      vitamin E 180 mg (400 unit) capsule Take 1 capsule (400 Units) by mouth once daily. 100 capsule 1    latanoprost (Xalatan) 0.005 % ophthalmic solution Administer 1 drop into affected eye(s) once daily at bedtime.      [DISCONTINUED] amLODIPine (Norvasc) 5 mg tablet Take 1 tablet (5 mg) by mouth once daily. (Patient not taking: Reported on 11/20/2024) 90 tablet 2    [DISCONTINUED] metFORMIN XR (Glucophage-XR) 500 mg 24 hr tablet Take 1 tablet (500 mg) by mouth once daily with breakfast. Do not crush, chew, or split. (Patient not taking: Reported on 11/20/2024) 90 tablet 2     No current facility-administered medications on file prior to visit.         PHYSICAL EXAM  /82 (BP Location: Left arm, Patient Position: Sitting, BP Cuff Size: Large adult)   Pulse 66   Temp 36.2 °C (97.1 °F)   Resp 16   Ht 1.626 m (5' 4\")   Wt 90.7 kg (200 lb)   SpO2 94%   BMI 34.33 kg/m²   Body mass index is 34.33 kg/m².  Constitutional   General appearance:  well developed, appears healthy, well nourished, overweight.   Eyes   Inspection of eyes: Sclera and conjunctiva were normal. wears glasses.   Ears, Nose, Mouth, and Throat   Ears: Auricles: Normal. No thyromegaly or neck masses   Pulmonary   Respiratory assessment: No respiratory distress, normal respiratory rhythm and effort.    Auscultation of Lungs: Clear bilateral breath sounds.   Cardiovascular   Auscultation of heart: Apical pulse normal,  The rhythm was regular. Heart sounds: normal S1 and normal S2. A soft systolic murmur was heard at " the LUSB > RUSB.    Exam for edema: No peripheral edema. no carotid bruits noted.   Lymphatic   Palpation of lymph nodes in neck: No cervical lymphadenopathy.   Neurologic   Cranial nerves: Nerves 2-12 were intact, no focal neuro defects.   Psychiatric   Orientation: Oriented to person, place, and time.    Mood and affect: Normal.   MSK  No localized  tenderness at present. Flexion of the left index finger has improved. He has seen urology and oncology.       ASSESSMENT/PLAN  Problem List Items Addressed This Visit       Diuretic-induced hypokalemia    Relevant Medications    potassium chloride CR (Klor-Con) 10 mEq ER tablet    HTN (hypertension)    Current Assessment & Plan     Patient has resumed hydrochlorothiazide. No change for now. The dose may be able to be decreased if the BP falls with use of the Jardiance          Relevant Medications    empagliflozin (Jardiance) 10 mg    potassium chloride CR (Klor-Con) 10 mEq ER tablet    Type 2 diabetes mellitus, without long-term current use of insulin (Multi) - Primary    Current Assessment & Plan     Not certain if metformin contributed to angioedema, will try Jardiance 10 mg daily, this may also have benefit of lowering BP . Will see if this is tolerated.  Check labs as already ordered         Relevant Medications    empagliflozin (Jardiance) 10 mg     Other Visit Diagnoses       Angioedema with urticaria due to drug        Relevant Medications    empagliflozin (Jardiance) 10 mg          Check labs as ordered at last visit    Thiago Camara MD

## 2024-11-20 NOTE — ASSESSMENT & PLAN NOTE
Patient has resumed hydrochlorothiazide. No change for now. The dose may be able to be decreased if the BP falls with use of the Jardiance

## 2024-12-09 ENCOUNTER — APPOINTMENT (OUTPATIENT)
Dept: HEMATOLOGY/ONCOLOGY | Facility: CLINIC | Age: 64
End: 2024-12-09
Payer: COMMERCIAL

## 2024-12-09 ENCOUNTER — TELEPHONE (OUTPATIENT)
Dept: HEMATOLOGY/ONCOLOGY | Facility: CLINIC | Age: 64
End: 2024-12-09
Payer: COMMERCIAL

## 2024-12-09 NOTE — TELEPHONE ENCOUNTER
Placed call to Mr. Naidu to discuss getting lab work. At this time he is unsure if he will be moving forward with follow up with my office. We discussed his MGUS and that we can follow up in 6/2025 if he would like. Will call back with final decision

## 2025-03-10 DIAGNOSIS — I10 ESSENTIAL (PRIMARY) HYPERTENSION: ICD-10-CM

## 2025-03-10 RX ORDER — HYDROCHLOROTHIAZIDE 25 MG/1
25 TABLET ORAL DAILY
Qty: 90 TABLET | Refills: 1 | Status: SHIPPED | OUTPATIENT
Start: 2025-03-10

## 2025-04-16 ENCOUNTER — APPOINTMENT (OUTPATIENT)
Dept: PRIMARY CARE | Facility: CLINIC | Age: 65
End: 2025-04-16
Payer: COMMERCIAL

## 2025-06-10 DIAGNOSIS — I10 HYPERTENSION, UNSPECIFIED TYPE: Primary | ICD-10-CM

## 2025-06-24 ENCOUNTER — APPOINTMENT (OUTPATIENT)
Dept: PRIMARY CARE | Facility: CLINIC | Age: 65
End: 2025-06-24
Payer: COMMERCIAL

## 2025-06-24 VITALS — HEART RATE: 66 BPM | SYSTOLIC BLOOD PRESSURE: 152 MMHG | DIASTOLIC BLOOD PRESSURE: 92 MMHG

## 2025-06-24 DIAGNOSIS — I10 HYPERTENSION, UNSPECIFIED TYPE: ICD-10-CM

## 2025-06-24 NOTE — PROGRESS NOTES
I reviewed the progress note and agree with the resident’s findings and plans as written. Case discussed with resident.    Esther Haley, LatriceD

## 2025-06-24 NOTE — PROGRESS NOTES
Clinical Pharmacy Visit    Patient ID: Jerry Naidu is a 64 y.o. male who presents for No chief complaint on file..  Referring Provider: Thiago Camara, *   Last visit: 11/20/24  Next visit: 7/2/25    Patient was referred to clinical pharmacy for [facility]  . Patient is here for first follow up.    Subjective     HTN Current Therapy:  HTCZ 25 mg daily     ASSESSMENT OF Blood Pressure  In Office Readings  BP Readings from Last 3 Encounters:   06/24/25 (!) 152/92   11/20/24 136/82   10/10/24 150/86     Current Home Readings:  Does not track bp at home  Has the patient experienced any low blood pressures since last contact? No  - denies symptoms of low blood pressure: lightheadedness, dizziness, falls, blurry vision, clammy/pale skin   Has the patient experienced any high blood pressures since last contact? No  - denies symptoms of high blood pressure: headache, chest pain, vision problems    Current Diet:  Try not to  Does use salt for seasoning and tablet salt  Eat out often  Sodium Intake:  salty foods: trying to avoid but does use salt in food    ASSESSMENTS:  Adherence/ Compliance: have not taken his vitamins but take other medications regularly  Adverse effects: none    Blood Pressure Monitor Validated: No; will schedule for an appt    Allergies[1]  Current Outpatient Medications   Medication Instructions    aspirin-calcium carbonate 81 mg-300 mg calcium(777 mg) tablet Take by mouth.    cetirizine (ZYRTEC) 10 mg, Daily    cholecalciferol (VITAMIN D3) 50 mcg, oral, Daily    empagliflozin (JARDIANCE) 10 mg, oral, Daily    hydroCHLOROthiazide (HYDRODIURIL) 25 mg, oral, Daily, as directed    latanoprost (Xalatan) 0.005 % ophthalmic solution 1 drop, ophthalmic (eye), Nightly    potassium chloride CR (Klor-Con) 10 mEq ER tablet 10 mEq, oral, Daily, Do not crush, chew, or split.    timolol (Timoptic-XR) 0.5 % ophthalmic gel-forming 1 drop, Daily    vitamin E 400 Units, oral, Daily       Objective     Risk  Assessment  Major Risk Factors Include:  Hypertension  Diabetes mellitus  Age > 55 (men) or > 65 (women)  The 10-year ASCVD risk score (Charo KOVACS, et al., 2019) is: 35.5%    Values used to calculate the score:      Age: 64 years      Sex: Male      Is Non- : Yes      Diabetic: Yes      Tobacco smoker: No      Systolic Blood Pressure: 152 mmHg      Is BP treated: Yes      HDL Cholesterol: 33.4 mg/dL      Total Cholesterol: 162 mg/dL  Known target organ damage:  None    History Pharmacotherapy  Lisinopril (Angioedema)    Drugs Interactions  none    Secondary Prevention  On Statin? none  Immunizations Needed: all need to be updated  Tobacco Use: never    Electrolytes: (DATE 5/22/24)  K: 3 - Low  Na: 139  Ca: 9.1  eGFR: 88    BP (!) 152/92   Pulse 66     Lab Review  Lab Results   Component Value Date    BILITOT 0.6 05/22/2024    CALCIUM 9.1 05/22/2024    CO2 28 05/22/2024     05/22/2024    CREATININE 0.98 05/22/2024    GLUCOSE 124 (H) 05/22/2024    ALKPHOS 73 05/22/2024    K 3.0 (L) 05/22/2024    PROT 7.6 06/03/2024     05/22/2024    AST 24 05/22/2024    ALT 38 05/22/2024    BUN 16 05/22/2024    ANIONGAP 11 05/22/2024    ALBUMIN 4.1 05/22/2024    GFRMALE 86 08/24/2023     Lab Results   Component Value Date    TRIG 113 05/22/2024    CHOL 162 05/22/2024    HDL 33.4 05/22/2024     Lab Results   Component Value Date    HGBA1C 6.6 (H) 05/22/2024    HGBA1C 6.5 (A) 07/19/2023     The 10-year ASCVD risk score (Charo KOVACS, et al., 2019) is: 35.5%    Values used to calculate the score:      Age: 64 years      Sex: Male      Is Non- : Yes      Diabetic: Yes      Tobacco smoker: No      Systolic Blood Pressure: 152 mmHg      Is BP treated: Yes      HDL Cholesterol: 33.4 mg/dL      Total Cholesterol: 162 mg/dL    Monitoring and Education Discussed:  Eat right: Your diet should be rich in fruits, vegetables, potassium, and low-fat dairy products. You should also reduce your  intake of sodium and fats, particularly saturated fats.  Maintain a healthy weight: Try to achieve and maintain a healthy weight. If you are unsure what this means for you, please contact our clinic.  Exercise: Try to get at least 30 minutes of aerobic exercise every day.  Moderate your alcohol consumption: Limit your alcohol intake to one drink per day.  Monitor your blood pressure: You should check your blood pressure regularly. Notify our clinic if your blood pressure readings are consistently higher than recommended.  Checking blood pressure at home:  Don't eat, smoke, or drink anything 30 mins before taking your blood pressure (especially coffee)  Empty your bladder before your reading  Sit in a comfortable chair for at least 5 mins before your reading  Put both feet flat on the ground and keep your legs uncrossed  Rest your arm with the cuff on a table at chest/heart height  Do not talk while your blood pressure is being measured   Check midday after taking blood pressure medications in the morning  If you work during the day or your morning is stressful (such as rushing out the door), check blood pressure in the evening    Assessment/Plan   Problem List Items Addressed This Visit       HTN (hypertension)    Relevant Orders    Referral to Clinical Pharmacy     Notes/Plans:    Patient is here for AHA HTN . No issue with compliance. No side effects. Patient have a BP cuff at home but does not monitor his BP. His bp reading was elevated today in office. Patient wish to reschedule for BP monitor validation after follow up with PCP next week. He stated he will schedule it on 7/2 after his appt.    Will keep everything the same for now. Patient is to start monitor his BP at least 3 times a week. Preferably in the morning, after his bp med, and before coffee or food. Will hold of setting a follow up for now since patient want to talk to PCP about our services first.    Had a discussion about his Vitamin D level  and the need for Vitamin E on his med list. Informed patient that his last Vitamin D level was low and vitamin E is for his trigger finger. Advised patient to restart both of those vitamins per recommendation from Dr MORAN in October. Patient verbalized understanding. He is also plan to get labs done this week.    Pharmacotherapy  CONTINUE all HTN meds  Future considerations: Add on losartan or amlodipine   Refills: Not needed  Follow up with clinical pharmacist: Hold off til patient has follow up with his PCP    Thank you,  Roxanne Marte [facility] - PGY1 Resident    Verbal consent to manage patient's drug therapy was obtained from the patient. They were informed they may decline to participate or withdraw from participation in pharmacy services at any time.          [1]   Allergies  Allergen Reactions    Lisinopril Angioedema

## 2025-06-27 ENCOUNTER — LAB (OUTPATIENT)
Dept: LAB | Facility: HOSPITAL | Age: 65
End: 2025-06-27
Payer: COMMERCIAL

## 2025-06-27 DIAGNOSIS — E11.9 TYPE 2 DIABETES MELLITUS WITHOUT COMPLICATION, WITHOUT LONG-TERM CURRENT USE OF INSULIN: ICD-10-CM

## 2025-06-27 DIAGNOSIS — E55.9 VITAMIN D INSUFFICIENCY: ICD-10-CM

## 2025-06-27 DIAGNOSIS — I10 PRIMARY HYPERTENSION: ICD-10-CM

## 2025-06-27 DIAGNOSIS — G47.33 OBSTRUCTIVE SLEEP APNEA, ADULT: ICD-10-CM

## 2025-06-27 DIAGNOSIS — M65.322 TRIGGER INDEX FINGER OF LEFT HAND: ICD-10-CM

## 2025-06-27 DIAGNOSIS — D47.2 MONOCLONAL GAMMOPATHY PRESENT ON SERUM PROTEIN ELECTROPHORESIS: ICD-10-CM

## 2025-06-27 DIAGNOSIS — R35.0 INCREASED URINARY FREQUENCY: ICD-10-CM

## 2025-06-27 LAB
ALBUMIN SERPL BCP-MCNC: 4.2 G/DL (ref 3.4–5)
ALP SERPL-CCNC: 80 U/L (ref 33–136)
ALT SERPL W P-5'-P-CCNC: 28 U/L (ref 10–52)
ANION GAP SERPL CALC-SCNC: 13 MMOL/L (ref 10–20)
AST SERPL W P-5'-P-CCNC: 19 U/L (ref 9–39)
BILIRUB SERPL-MCNC: 0.6 MG/DL (ref 0–1.2)
BUN SERPL-MCNC: 18 MG/DL (ref 6–23)
CALCIUM SERPL-MCNC: 9.5 MG/DL (ref 8.6–10.6)
CHLORIDE SERPL-SCNC: 103 MMOL/L (ref 98–107)
CO2 SERPL-SCNC: 29 MMOL/L (ref 21–32)
CREAT SERPL-MCNC: 1.04 MG/DL (ref 0.5–1.3)
EGFRCR SERPLBLD CKD-EPI 2021: 81 ML/MIN/1.73M*2
GLUCOSE SERPL-MCNC: 101 MG/DL (ref 74–99)
POTASSIUM SERPL-SCNC: 3.4 MMOL/L (ref 3.5–5.3)
PROT SERPL-MCNC: 7.8 G/DL (ref 6.4–8.2)
SODIUM SERPL-SCNC: 142 MMOL/L (ref 136–145)

## 2025-06-27 PROCEDURE — 82784 ASSAY IGA/IGD/IGG/IGM EACH: CPT

## 2025-06-27 PROCEDURE — 84155 ASSAY OF PROTEIN SERUM: CPT

## 2025-06-27 PROCEDURE — 83521 IG LIGHT CHAINS FREE EACH: CPT

## 2025-06-27 PROCEDURE — 86334 IMMUNOFIX E-PHORESIS SERUM: CPT

## 2025-06-27 PROCEDURE — 85025 COMPLETE CBC W/AUTO DIFF WBC: CPT

## 2025-06-27 PROCEDURE — 84165 PROTEIN E-PHORESIS SERUM: CPT

## 2025-06-27 PROCEDURE — 80053 COMPREHEN METABOLIC PANEL: CPT

## 2025-06-28 LAB
25(OH)D3+25(OH)D2 SERPL-MCNC: 14 NG/ML (ref 30–100)
ALBUMIN SERPL-MCNC: 4.3 G/DL (ref 3.6–5.1)
ALBUMIN/CREAT UR: NORMAL
ALP SERPL-CCNC: 81 U/L (ref 35–144)
ALT SERPL-CCNC: 28 U/L (ref 9–46)
ANION GAP SERPL CALCULATED.4IONS-SCNC: 7 MMOL/L (CALC) (ref 7–17)
AST SERPL-CCNC: 18 U/L (ref 10–35)
BILIRUB SERPL-MCNC: 0.6 MG/DL (ref 0.2–1.2)
BUN SERPL-MCNC: 18 MG/DL (ref 7–25)
CALCIUM SERPL-MCNC: 9.5 MG/DL (ref 8.6–10.3)
CHLORIDE SERPL-SCNC: 102 MMOL/L (ref 98–110)
CHOLEST SERPL-MCNC: 162 MG/DL
CHOLEST/HDLC SERPL: 4.9 (CALC)
CO2 SERPL-SCNC: 31 MMOL/L (ref 20–32)
CREAT SERPL-MCNC: 0.98 MG/DL (ref 0.7–1.35)
CREAT UR-MCNC: NORMAL MG/DL
EGFRCR SERPLBLD CKD-EPI 2021: 87 ML/MIN/1.73M2
EST. AVERAGE GLUCOSE BLD GHB EST-MCNC: 140 MG/DL
EST. AVERAGE GLUCOSE BLD GHB EST-SCNC: 7.7 MMOL/L
GLUCOSE SERPL-MCNC: 104 MG/DL (ref 65–99)
HBA1C MFR BLD: 6.5 %
HDLC SERPL-MCNC: 33 MG/DL
LDLC SERPL CALC-MCNC: 109 MG/DL (CALC)
MICROALBUMIN UR-MCNC: NORMAL
NONHDLC SERPL-MCNC: 129 MG/DL (CALC)
POTASSIUM SERPL-SCNC: 3.4 MMOL/L (ref 3.5–5.3)
PROT SERPL-MCNC: 7.9 G/DL (ref 6.1–8.1)
SODIUM SERPL-SCNC: 140 MMOL/L (ref 135–146)
TRIGL SERPL-MCNC: 103 MG/DL

## 2025-06-30 ENCOUNTER — LAB (OUTPATIENT)
Dept: LAB | Facility: HOSPITAL | Age: 65
End: 2025-06-30
Payer: COMMERCIAL

## 2025-06-30 DIAGNOSIS — D47.2 MONOCLONAL GAMMOPATHY PRESENT ON SERUM PROTEIN ELECTROPHORESIS: ICD-10-CM

## 2025-06-30 DIAGNOSIS — D47.2 MONOCLONAL GAMMOPATHY PRESENT ON SERUM PROTEIN ELECTROPHORESIS: Primary | ICD-10-CM

## 2025-06-30 LAB
25(OH)D3+25(OH)D2 SERPL-MCNC: 14 NG/ML (ref 30–100)
ALBUMIN SERPL-MCNC: 4.3 G/DL (ref 3.6–5.1)
ALBUMIN/CREAT UR: 10 MG/G CREAT
ALP SERPL-CCNC: 81 U/L (ref 35–144)
ALT SERPL-CCNC: 28 U/L (ref 9–46)
ANION GAP SERPL CALCULATED.4IONS-SCNC: 7 MMOL/L (CALC) (ref 7–17)
AST SERPL-CCNC: 18 U/L (ref 10–35)
BASOPHILS # BLD AUTO: 0.05 X10*3/UL (ref 0–0.1)
BASOPHILS NFR BLD AUTO: 0.9 %
BILIRUB SERPL-MCNC: 0.6 MG/DL (ref 0.2–1.2)
BUN SERPL-MCNC: 18 MG/DL (ref 7–25)
CALCIUM SERPL-MCNC: 9.5 MG/DL (ref 8.6–10.3)
CHLORIDE SERPL-SCNC: 102 MMOL/L (ref 98–110)
CHOLEST SERPL-MCNC: 162 MG/DL
CHOLEST/HDLC SERPL: 4.9 (CALC)
CO2 SERPL-SCNC: 31 MMOL/L (ref 20–32)
CREAT SERPL-MCNC: 0.98 MG/DL (ref 0.7–1.35)
CREAT UR-MCNC: 239 MG/DL (ref 20–320)
EGFRCR SERPLBLD CKD-EPI 2021: 87 ML/MIN/1.73M2
EOSINOPHIL # BLD AUTO: 0.11 X10*3/UL (ref 0–0.7)
EOSINOPHIL NFR BLD AUTO: 1.9 %
ERYTHROCYTE [DISTWIDTH] IN BLOOD BY AUTOMATED COUNT: 15.2 % (ref 11.5–14.5)
EST. AVERAGE GLUCOSE BLD GHB EST-MCNC: 140 MG/DL
EST. AVERAGE GLUCOSE BLD GHB EST-SCNC: 7.7 MMOL/L
GLUCOSE SERPL-MCNC: 104 MG/DL (ref 65–99)
HBA1C MFR BLD: 6.5 %
HCT VFR BLD AUTO: 46.4 % (ref 41–52)
HDLC SERPL-MCNC: 33 MG/DL
HGB BLD-MCNC: 14.8 G/DL (ref 13.5–17.5)
IGA SERPL-MCNC: 270 MG/DL (ref 70–400)
IGG SERPL-MCNC: 1810 MG/DL (ref 700–1600)
IGM SERPL-MCNC: 41 MG/DL (ref 40–230)
IMM GRANULOCYTES # BLD AUTO: 0.01 X10*3/UL (ref 0–0.7)
IMM GRANULOCYTES NFR BLD AUTO: 0.2 % (ref 0–0.9)
LDLC SERPL CALC-MCNC: 109 MG/DL (CALC)
LYMPHOCYTES # BLD AUTO: 2.25 X10*3/UL (ref 1.2–4.8)
LYMPHOCYTES NFR BLD AUTO: 39.3 %
MCH RBC QN AUTO: 26 PG (ref 26–34)
MCHC RBC AUTO-ENTMCNC: 31.9 G/DL (ref 32–36)
MCV RBC AUTO: 81 FL (ref 80–100)
MICROALBUMIN UR-MCNC: 2.5 MG/DL
MONOCYTES # BLD AUTO: 0.51 X10*3/UL (ref 0.1–1)
MONOCYTES NFR BLD AUTO: 8.9 %
NEUTROPHILS # BLD AUTO: 2.79 X10*3/UL (ref 1.2–7.7)
NEUTROPHILS NFR BLD AUTO: 48.8 %
NONHDLC SERPL-MCNC: 129 MG/DL (CALC)
NRBC BLD-RTO: 0 /100 WBCS (ref 0–0)
PLATELET # BLD AUTO: 220 X10*3/UL (ref 150–450)
POTASSIUM SERPL-SCNC: 3.4 MMOL/L (ref 3.5–5.3)
PROT SERPL-MCNC: 7.6 G/DL (ref 6.4–8.2)
PROT SERPL-MCNC: 7.9 G/DL (ref 6.1–8.1)
RBC # BLD AUTO: 5.7 X10*6/UL (ref 4.5–5.9)
SODIUM SERPL-SCNC: 140 MMOL/L (ref 135–146)
TRIGL SERPL-MCNC: 103 MG/DL
WBC # BLD AUTO: 5.7 X10*3/UL (ref 4.4–11.3)

## 2025-07-01 LAB
KAPPA LC SERPL-MCNC: 3.15 MG/DL (ref 0.33–1.94)
KAPPA LC/LAMBDA SER: 1.92 {RATIO} (ref 0.26–1.65)
LAMBDA LC SERPL-MCNC: 1.64 MG/DL (ref 0.57–2.63)

## 2025-07-02 ENCOUNTER — APPOINTMENT (OUTPATIENT)
Dept: PRIMARY CARE | Facility: CLINIC | Age: 65
End: 2025-07-02
Payer: COMMERCIAL

## 2025-07-02 VITALS
HEART RATE: 60 BPM | RESPIRATION RATE: 18 BRPM | TEMPERATURE: 97.7 F | OXYGEN SATURATION: 92 % | SYSTOLIC BLOOD PRESSURE: 118 MMHG | HEIGHT: 64 IN | BODY MASS INDEX: 33.31 KG/M2 | DIASTOLIC BLOOD PRESSURE: 73 MMHG | WEIGHT: 195.1 LBS

## 2025-07-02 DIAGNOSIS — E55.9 VITAMIN D INSUFFICIENCY: ICD-10-CM

## 2025-07-02 DIAGNOSIS — T50.905A ANGIOEDEMA WITH URTICARIA DUE TO DRUG: ICD-10-CM

## 2025-07-02 DIAGNOSIS — M65.322 TRIGGER INDEX FINGER OF LEFT HAND: ICD-10-CM

## 2025-07-02 DIAGNOSIS — E78.5 HYPERLIPIDEMIA ASSOCIATED WITH TYPE 2 DIABETES MELLITUS: ICD-10-CM

## 2025-07-02 DIAGNOSIS — T78.3XXA ANGIOEDEMA WITH URTICARIA DUE TO DRUG: ICD-10-CM

## 2025-07-02 DIAGNOSIS — E11.9 TYPE 2 DIABETES MELLITUS WITHOUT COMPLICATION, WITHOUT LONG-TERM CURRENT USE OF INSULIN: Primary | ICD-10-CM

## 2025-07-02 DIAGNOSIS — I10 PRIMARY HYPERTENSION: ICD-10-CM

## 2025-07-02 DIAGNOSIS — G47.33 OBSTRUCTIVE SLEEP APNEA, ADULT: ICD-10-CM

## 2025-07-02 DIAGNOSIS — E11.69 HYPERLIPIDEMIA ASSOCIATED WITH TYPE 2 DIABETES MELLITUS: ICD-10-CM

## 2025-07-02 PROBLEM — H57.89 REDNESS OF EYE, RIGHT: Status: RESOLVED | Noted: 2023-06-15 | Resolved: 2025-07-02

## 2025-07-02 LAB
ALBUMIN: 4.2 G/DL (ref 3.4–5)
ALPHA 1 GLOBULIN: 0.2 G/DL (ref 0.2–0.6)
ALPHA 2 GLOBULIN: 0.6 G/DL (ref 0.4–1.1)
BETA GLOBULIN: 0.9 G/DL (ref 0.5–1.2)
GAMMA GLOBULIN: 1.7 G/DL (ref 0.5–1.4)
IMMUNOFIXATION COMMENT: ABNORMAL
M-PROTEIN 1: 0.3 G/DL (ref ?–0)
PATH REVIEW - SERUM IMMUNOFIXATION: ABNORMAL
PATH REVIEW-SERUM PROTEIN ELECTROPHORESIS: ABNORMAL
PROTEIN ELECTROPHORESIS COMMENT: ABNORMAL

## 2025-07-02 PROCEDURE — 3008F BODY MASS INDEX DOCD: CPT | Performed by: INTERNAL MEDICINE

## 2025-07-02 PROCEDURE — 1036F TOBACCO NON-USER: CPT | Performed by: INTERNAL MEDICINE

## 2025-07-02 PROCEDURE — 99215 OFFICE O/P EST HI 40 MIN: CPT | Performed by: INTERNAL MEDICINE

## 2025-07-02 PROCEDURE — 3078F DIAST BP <80 MM HG: CPT | Performed by: INTERNAL MEDICINE

## 2025-07-02 PROCEDURE — 3074F SYST BP LT 130 MM HG: CPT | Performed by: INTERNAL MEDICINE

## 2025-07-02 RX ORDER — ACETAMINOPHEN 500 MG
50 TABLET ORAL DAILY
Qty: 100 CAPSULE | Refills: 2 | Status: SHIPPED | OUTPATIENT
Start: 2025-07-02

## 2025-07-02 RX ORDER — VIT C/E/ZN/COPPR/LUTEIN/ZEAXAN 250MG-90MG
180 CAPSULE ORAL DAILY
Qty: 100 CAPSULE | Refills: 1 | Status: SHIPPED | OUTPATIENT
Start: 2025-07-02 | End: 2026-07-02

## 2025-07-02 RX ORDER — BRIMONIDINE TARTRATE AND TIMOLOL MALEATE 2; 5 MG/ML; MG/ML
SOLUTION OPHTHALMIC
COMMUNITY
Start: 2024-11-12

## 2025-07-02 RX ORDER — ROSUVASTATIN CALCIUM 5 MG/1
5 TABLET, COATED ORAL DAILY
Qty: 90 TABLET | Refills: 1 | Status: SHIPPED | OUTPATIENT
Start: 2025-07-02 | End: 2026-08-06

## 2025-07-02 SDOH — ECONOMIC STABILITY: FOOD INSECURITY: WITHIN THE PAST 12 MONTHS, YOU WORRIED THAT YOUR FOOD WOULD RUN OUT BEFORE YOU GOT MONEY TO BUY MORE.: NEVER TRUE

## 2025-07-02 SDOH — ECONOMIC STABILITY: FOOD INSECURITY: WITHIN THE PAST 12 MONTHS, THE FOOD YOU BOUGHT JUST DIDN'T LAST AND YOU DIDN'T HAVE MONEY TO GET MORE.: NEVER TRUE

## 2025-07-02 ASSESSMENT — LIFESTYLE VARIABLES
HOW MANY STANDARD DRINKS CONTAINING ALCOHOL DO YOU HAVE ON A TYPICAL DAY: PATIENT DOES NOT DRINK
HOW OFTEN DO YOU HAVE A DRINK CONTAINING ALCOHOL: NEVER
SKIP TO QUESTIONS 9-10: 1
HOW OFTEN DO YOU HAVE SIX OR MORE DRINKS ON ONE OCCASION: NEVER
AUDIT-C TOTAL SCORE: 0

## 2025-07-02 NOTE — ASSESSMENT & PLAN NOTE
Increase the Jardiance to 25 mg daily, recheck labs in a few months, patient already speaks to clinical pharmacy

## 2025-07-02 NOTE — ASSESSMENT & PLAN NOTE
Patient has ongoing issues, refer to Dr Gil for follow up  . Patient would like to defer follow up for now

## 2025-07-02 NOTE — PROGRESS NOTES
"Chief Complaint/HPI:    HTN: patient takes HCTZ 25 mg . He does not monitor his BP at home. Denies chest pain, SOB, pounding headache. Patient takes potassium supplement also      DM type 2:   He takes Jardiance. 10 mg daily, labs were completed     S/p cataract surgery: patient had cataract surgery at [facility] in [place], patient states that he still has visual issues, patient has seen Dr Valencia at [facility] for evaluation . He wanted to defer any additional eye procedures for now, a \"scraping \" was suggested to improve the vision, but the patient has deferred for now. He now goes to [facility]. He uses ophthalmic drops. The vision has not improved, he is going to follow up for reassessment.     MGUS: Follows with heme/onc, he has had blood work recently     Vitamin D insufficiency: patient has not been taking vitamin D. Most recent level was 14. He recently restarted vitamin D     Left index finger trigger: Saw Dr. Gil and received injection. States it helped a little but he still cannot close the hand all the way. He has noted increased symptoms recently, patient has ongoing issues with the same finger     Urinary frequency: patient is having urinary frequency and he gets urgency also, patient saw urology, he was ordered Cialis but he never took the med , he has been getting a lot of bills for testing that was completed. He appears to have been seen by urology over a year ago.  He denies nocturia, he urinate frequently during the day.     Colonoscopy 2023 with 7 year clearance.    GREGOR: CPAP is not working well, he would like to get an additional sleep study,     ROS otherwise negative aside from what was mentioned above in HPI.      Problem List[1]      Medical History[2]  Surgical History[3]  Social History     Social History Narrative    Not on file         ALLERGIES  Lisinopril      MEDICATIONS  Medications Ordered Prior to Encounter[4]      PHYSICAL EXAM  /73 (BP Location: Left arm, Patient Position: Sitting, " "BP Cuff Size: Adult)   Pulse 60   Temp 36.5 °C (97.7 °F) (Temporal)   Resp 18   Ht 1.626 m (5' 4\")   Wt 88.5 kg (195 lb 1.6 oz)   SpO2 92%   BMI 33.49 kg/m²   Body mass index is 33.49 kg/m².  Constitutional   General appearance:  well developed, appears healthy, well nourished, overweight.   Eyes   Inspection of eyes: Sclera and conjunctiva were normal. wears glasses.   Ears, Nose, Mouth, and Throat   Ears: Auricles: Normal. No thyromegaly or neck masses   Pulmonary   Respiratory assessment: No respiratory distress, normal respiratory rhythm and effort.    Auscultation of Lungs: Clear bilateral breath sounds.   Cardiovascular   Auscultation of heart: Apical pulse normal,  The rhythm was regular. Heart sounds: normal S1 and normal S2. A soft systolic murmur was heard at the LUSB > RUSB.    Exam for edema: No peripheral edema. no carotid bruits noted.   Lymphatic   Palpation of lymph nodes in neck: No cervical lymphadenopathy.   Neurologic   Cranial nerves: Nerves 2-12 were intact, no focal neuro defects.   Psychiatric   Orientation: Oriented to person, place, and time.    Mood and affect: Normal.   MSK  No localized  tenderness at present. Flexion of the left index finger is reduced. Slight tenderness over the base of the left middle finger is noted also     He has seen urology and oncology.       ASSESSMENT/PLAN  Problem List Items Addressed This Visit       HTN (hypertension)    Current Assessment & Plan   BP is controlled, continue current med therapy and potassium, recheck labs in 6 months         Relevant Medications    empagliflozin (Jardiance) 25 mg tablet    Other Relevant Orders    Comprehensive Metabolic Panel    Hyperlipidemia associated with type 2 diabetes mellitus    Current Assessment & Plan   Add low dose rosuvastatin, 5 mg daily, recheck labs in 6 months         Relevant Medications    cholecalciferol (Vitamin D3) 50 mcg (2,000 units) capsule    vitamin E 180 mg (400 units) capsule    " rosuvastatin (Crestor) 5 mg tablet    Other Relevant Orders    Comprehensive Metabolic Panel    Lipid Panel    Obstructive sleep apnea, adult    Relevant Orders    In-Center Sleep Study (Non-Sleep Provider)    Comprehensive Metabolic Panel    Trigger index finger of left hand    Current Assessment & Plan   Patient has ongoing issues, refer to Dr Gil for follow up  . Patient would like to defer follow up for now         Relevant Medications    vitamin E 180 mg (400 units) capsule    Other Relevant Orders    Comprehensive Metabolic Panel    Type 2 diabetes mellitus, without long-term current use of insulin (Multi) - Primary    Current Assessment & Plan   Increase the Jardiance to 25 mg daily, recheck labs in a few months, patient already speaks to clinical pharmacy         Relevant Medications    rosuvastatin (Crestor) 5 mg tablet    empagliflozin (Jardiance) 25 mg tablet    Other Relevant Orders    In-Center Sleep Study (Non-Sleep Provider)    Albumin-Creatinine Ratio, Urine Random    Comprehensive Metabolic Panel    Hemoglobin A1C    Vitamin D insufficiency    Current Assessment & Plan   Take vitamin d daily, recheck labs in 6 months         Relevant Medications    cholecalciferol (Vitamin D3) 50 mcg (2,000 units) capsule    Other Relevant Orders    Comprehensive Metabolic Panel    Vitamin D 25-Hydroxy,Total (for eval of Vitamin D levels)     Other Visit Diagnoses         Angioedema with urticaria due to drug        Relevant Medications    empagliflozin (Jardiance) 25 mg tablet    Other Relevant Orders    Comprehensive Metabolic Panel          Follow up in 6 months, check labs prior to next visit    Thiago Camara MD          [1]   Patient Active Problem List  Diagnosis    ABMD (anterior basement membrane dystrophy)    Blurring of visual image    Cataract, bilateral    Conductive hearing loss of right ear with unrestricted hearing of left ear    Foreign body in anterior segment of right eyeball    HTN  (hypertension)    IFG (impaired fasting glucose)    Obstructive sleep apnea, adult    Pseudophakia of both eyes    Vitamin D insufficiency    Systolic murmur    Acute midline low back pain without sciatica    Prostate cancer screening    Increased urinary frequency    Monoclonal gammopathy present on serum protein electrophoresis    Diuretic-induced hypokalemia    Trigger index finger of left hand    Type 2 diabetes mellitus, without long-term current use of insulin (Multi)    History of glaucoma    History of hypertension    Hyperlipidemia associated with type 2 diabetes mellitus   [2]   Past Medical History:  Diagnosis Date    Impacted cerumen 10/10/2024    Other allergy status, other than to drugs and biological substances     History of environmental allergies    Personal history of other diseases of the circulatory system 11/05/2019    History of hypertension    Personal history of other diseases of the nervous system and sense organs     History of glaucoma   [3]   Past Surgical History:  Procedure Laterality Date    OTHER SURGICAL HISTORY  05/18/2021    Cataract surgery   [4]   Current Outpatient Medications on File Prior to Visit   Medication Sig Dispense Refill    aspirin-calcium carbonate 81 mg-300 mg calcium(777 mg) tablet Take by mouth.      brimonidine-timoloL (Combigan) 0.2-0.5 % ophthalmic solution instill 1 drop into right eye twice a day      cetirizine (ZyrTEC) 10 mg tablet Take 1 tablet (10 mg) by mouth once daily.      hydroCHLOROthiazide (HYDRODiuril) 25 mg tablet TAKE 1 TABLET BY MOUTH EVERY DAY AS DIRECTED 90 tablet 1    latanoprost (Xalatan) 0.005 % ophthalmic solution Administer 1 drop into affected eye(s) once daily at bedtime.      potassium chloride CR (Klor-Con) 10 mEq ER tablet Take 1 tablet (10 mEq) by mouth once daily. Do not crush, chew, or split. 90 tablet 3    timolol (Timoptic-XR) 0.5 % ophthalmic gel-forming Administer 1 drop into the right eye once daily.      [DISCONTINUED]  cholecalciferol (Vitamin D3) 50 mcg (2,000 unit) capsule Take 1 capsule (50 mcg) by mouth early in the morning.. 100 capsule 2    [DISCONTINUED] empagliflozin (Jardiance) 10 mg Take 1 tablet (10 mg) by mouth once daily. 90 tablet 3    [DISCONTINUED] vitamin E 180 mg (400 unit) capsule Take 1 capsule (400 Units) by mouth once daily. 100 capsule 1     No current facility-administered medications on file prior to visit.

## 2025-08-26 ENCOUNTER — APPOINTMENT (OUTPATIENT)
Dept: SLEEP MEDICINE | Facility: CLINIC | Age: 65
End: 2025-08-26
Payer: COMMERCIAL

## 2026-01-14 ENCOUNTER — APPOINTMENT (OUTPATIENT)
Dept: PRIMARY CARE | Facility: CLINIC | Age: 66
End: 2026-01-14
Payer: COMMERCIAL